# Patient Record
Sex: MALE | Race: WHITE | NOT HISPANIC OR LATINO | URBAN - METROPOLITAN AREA
[De-identification: names, ages, dates, MRNs, and addresses within clinical notes are randomized per-mention and may not be internally consistent; named-entity substitution may affect disease eponyms.]

---

## 2017-01-09 ENCOUNTER — AMBULATORY SURGICAL CENTER (AMBULATORY)
Dept: URBAN - METROPOLITAN AREA SURGERY 17 | Facility: SURGERY | Age: 67
End: 2017-01-09

## 2017-01-09 ENCOUNTER — OFFICE (AMBULATORY)
Dept: URBAN - METROPOLITAN AREA CLINIC 64 | Facility: CLINIC | Age: 67
End: 2017-01-09

## 2017-01-09 VITALS
DIASTOLIC BLOOD PRESSURE: 44 MMHG | SYSTOLIC BLOOD PRESSURE: 138 MMHG | SYSTOLIC BLOOD PRESSURE: 122 MMHG | HEART RATE: 48 BPM | DIASTOLIC BLOOD PRESSURE: 77 MMHG | OXYGEN SATURATION: 93 % | OXYGEN SATURATION: 94 % | OXYGEN SATURATION: 98 % | DIASTOLIC BLOOD PRESSURE: 51 MMHG | HEART RATE: 68 BPM | SYSTOLIC BLOOD PRESSURE: 107 MMHG | DIASTOLIC BLOOD PRESSURE: 48 MMHG | TEMPERATURE: 96.9 F | RESPIRATION RATE: 16 BRPM | DIASTOLIC BLOOD PRESSURE: 62 MMHG | SYSTOLIC BLOOD PRESSURE: 117 MMHG | RESPIRATION RATE: 15 BRPM | DIASTOLIC BLOOD PRESSURE: 41 MMHG | HEART RATE: 42 BPM | HEIGHT: 70 IN | DIASTOLIC BLOOD PRESSURE: 81 MMHG | OXYGEN SATURATION: 95 % | DIASTOLIC BLOOD PRESSURE: 35 MMHG | HEART RATE: 57 BPM | HEART RATE: 52 BPM | OXYGEN SATURATION: 92 % | RESPIRATION RATE: 17 BRPM | SYSTOLIC BLOOD PRESSURE: 195 MMHG | HEART RATE: 47 BPM | SYSTOLIC BLOOD PRESSURE: 150 MMHG | HEART RATE: 62 BPM | RESPIRATION RATE: 20 BRPM | WEIGHT: 225 LBS | SYSTOLIC BLOOD PRESSURE: 118 MMHG | HEART RATE: 43 BPM | RESPIRATION RATE: 9 BRPM | DIASTOLIC BLOOD PRESSURE: 50 MMHG | DIASTOLIC BLOOD PRESSURE: 78 MMHG | RESPIRATION RATE: 7 BRPM | TEMPERATURE: 98 F | SYSTOLIC BLOOD PRESSURE: 153 MMHG

## 2017-01-09 DIAGNOSIS — Z86.010 PERSONAL HISTORY OF COLONIC POLYPS: ICD-10-CM

## 2017-01-09 DIAGNOSIS — Z12.11 ENCOUNTER FOR SCREENING FOR MALIGNANT NEOPLASM OF COLON: ICD-10-CM

## 2017-01-09 DIAGNOSIS — R19.7 DIARRHEA, UNSPECIFIED: ICD-10-CM

## 2017-01-09 LAB
GI HISTOLOGY: A. SELECT: (no result)
GI HISTOLOGY: PDF REPORT: (no result)

## 2017-01-09 PROCEDURE — 88305 TISSUE EXAM BY PATHOLOGIST: CPT

## 2017-01-09 PROCEDURE — 45380 COLONOSCOPY AND BIOPSY: CPT

## 2017-01-09 RX ADMIN — LIDOCAINE HYDROCHLORIDE 25 MG: 10 INJECTION, SOLUTION EPIDURAL; INFILTRATION; INTRACAUDAL; PERINEURAL at 11:18

## 2017-01-09 RX ADMIN — PROPOFOL 25 MG: 10 INJECTION, EMULSION INTRAVENOUS at 11:21

## 2017-01-09 RX ADMIN — PROPOFOL 25 MG: 10 INJECTION, EMULSION INTRAVENOUS at 11:22

## 2017-01-09 RX ADMIN — PROPOFOL 25 MG: 10 INJECTION, EMULSION INTRAVENOUS at 11:28

## 2017-01-09 RX ADMIN — PROPOFOL 25 MG: 10 INJECTION, EMULSION INTRAVENOUS at 11:19

## 2017-01-09 RX ADMIN — PROPOFOL 25 MG: 10 INJECTION, EMULSION INTRAVENOUS at 11:20

## 2017-01-09 RX ADMIN — PROPOFOL 25 MG: 10 INJECTION, EMULSION INTRAVENOUS at 11:30

## 2017-01-09 RX ADMIN — PROPOFOL 25 MG: 10 INJECTION, EMULSION INTRAVENOUS at 11:25

## 2017-01-09 RX ADMIN — PROPOFOL 50 MG: 10 INJECTION, EMULSION INTRAVENOUS at 11:18

## 2017-01-09 RX ADMIN — PROPOFOL 25 MG: 10 INJECTION, EMULSION INTRAVENOUS at 11:26

## 2017-01-09 RX ADMIN — PROPOFOL 25 MG: 10 INJECTION, EMULSION INTRAVENOUS at 11:23

## 2017-01-09 RX ADMIN — PROPOFOL 25 MG: 10 INJECTION, EMULSION INTRAVENOUS at 11:31

## 2019-08-26 ENCOUNTER — OFFICE VISIT (OUTPATIENT)
Dept: CARDIOLOGY | Facility: CLINIC | Age: 69
End: 2019-08-26

## 2019-08-26 VITALS
WEIGHT: 233 LBS | HEART RATE: 46 BPM | HEIGHT: 68 IN | SYSTOLIC BLOOD PRESSURE: 182 MMHG | DIASTOLIC BLOOD PRESSURE: 82 MMHG | BODY MASS INDEX: 35.31 KG/M2

## 2019-08-26 DIAGNOSIS — I25.10 CORONARY ARTERY DISEASE INVOLVING NATIVE CORONARY ARTERY OF NATIVE HEART WITHOUT ANGINA PECTORIS: ICD-10-CM

## 2019-08-26 DIAGNOSIS — E78.2 MIXED HYPERLIPIDEMIA: ICD-10-CM

## 2019-08-26 DIAGNOSIS — R00.1 BRADYCARDIA: Primary | ICD-10-CM

## 2019-08-26 DIAGNOSIS — G47.33 OBSTRUCTIVE SLEEP APNEA SYNDROME: ICD-10-CM

## 2019-08-26 DIAGNOSIS — Z95.5 STATUS POST INSERTION OF DRUG ELUTING CORONARY ARTERY STENT: ICD-10-CM

## 2019-08-26 DIAGNOSIS — I10 ESSENTIAL HYPERTENSION: ICD-10-CM

## 2019-08-26 PROBLEM — Z94.83 HISTORY OF SIMULTANEOUS KIDNEY AND PANCREAS TRANSPLANT (HCC): Status: ACTIVE | Noted: 2019-08-26

## 2019-08-26 PROBLEM — Z94.0 HISTORY OF SIMULTANEOUS KIDNEY AND PANCREAS TRANSPLANT (HCC): Status: ACTIVE | Noted: 2019-08-26

## 2019-08-26 PROCEDURE — 99214 OFFICE O/P EST MOD 30 MIN: CPT | Performed by: INTERNAL MEDICINE

## 2019-08-26 NOTE — PROGRESS NOTES
Subjective:     Encounter Date:08/26/2019      Patient ID: Armando Montano is a 68 y.o. male.    Chief Complaint:  Chief Complaint   Patient presents with   • Coronary Artery Disease       HPI:  I the pleasure seeing Armando in the office today.  He is a pleasant 68-year-old gentleman with a history of hypertension, kidney and pancreas transplant, coronary artery disease with prior stent placements.  He also has a history of dyslipidemia, diabetes mellitus and obstructive sleep apnea.  He has a history of bradycardia.    Armando is in the office today for continued care.  He denies symptoms of chest discomfort or shortness of air.  He did not have symptoms prior to his stent placements.  His blood pressure is significantly elevated.  He states he has not taken his medication today.  His wife will monitor his blood pressure at home and call me with any continued elevated results.  He denies orthopnea or paroxysmal nocturnal dyspnea.  He has mild lower extremity edema.  He denies palpitations, dizziness or near syncope.    The following portions of the patient's history were reviewed and updated as appropriate: allergies, current medications, past family history, past medical history, past social history, past surgical history and problem list.    Problem List:  Patient Active Problem List   Diagnosis   • Stroke (CMS/[facility])   • Sleep apnea   • Hypertension   • Hyperlipidemia   • Diabetes mellitus (CMS/[facility])   • Coronary artery disease   • Bradycardia   • Status post insertion of drug eluting coronary artery stent   • History of simultaneous kidney and pancreas transplant (CMS/[facility])       Past Medical History:  Past Medical History:   Diagnosis Date   • Bradycardia    • Coronary artery disease     May 2011: Vision bare-metal stent placed to the LAD.  December 2012: Xience drug-eluting stent placed to the LAD, overlapping the previous stent.   • Coronary artery disease     10/2009: Left main normal; mid LAD 30%;  diagonal with minial disease. % stenosis, small vessel. RCA minimal disease. 5/2011: LAD with mid 80% stenosis and distal 40%. OM 2 with 30% stenosis. OM3, small with 80-90% stenosis RCA dominant with distal 20%. May 2011: Vision bare-metal stent to the LAD. 12/2012: Xience stent to the LAD overlapping previously placed stent. Stent placed to the OM2   • Diabetes mellitus (CMS/HCC)    • Hyperlipidemia    • Hypertension    • Sleep apnea    • Stroke (CMS/HCC)        Past Surgical History:  Past Surgical History:   Procedure Laterality Date   • APPENDECTOMY     • CARDIAC CATHETERIZATION     • CHOLECYSTECTOMY     • COMBINED KIDNEY-PANCREAS TRANSPLANT     • CORONARY STENT PLACEMENT      May 2011: Vision bare-metal stent to the LAD. December 2012: Xience stent to the LAD overlapping the previously placed stent. Stent placed to the OM2.   • TONSILLECTOMY AND ADENOIDECTOMY         Social History:  Social History     Socioeconomic History   • Marital status:      Spouse name: Not on file   • Number of children: Not on file   • Years of education: Not on file   • Highest education level: Not on file   Tobacco Use   • Smoking status: Never Smoker   Substance and Sexual Activity   • Alcohol use: No     Frequency: Never   • Drug use: No   • Sexual activity: Defer       Allergies:  Allergies   Allergen Reactions   • Bactrim  [Sulfamethoxazole-Trimethoprim] Unknown (See Comments)   • Furosemide Unknown (See Comments)   • Rosuvastatin Calcium Unknown (See Comments)           ROS:  Review of Systems   Constitution: Positive for malaise/fatigue. Negative for chills, decreased appetite, fever, weight gain and weight loss.   HENT: Negative for congestion, hoarse voice, nosebleeds and sore throat.    Eyes: Positive for vision loss in left eye and vision loss in right eye. Negative for blurred vision, double vision and visual disturbance.   Cardiovascular: Positive for leg swelling. Negative for chest pain, claudication,  "dyspnea on exertion, irregular heartbeat, near-syncope, orthopnea, palpitations, paroxysmal nocturnal dyspnea and syncope.   Respiratory: Negative for cough, hemoptysis, shortness of breath, sleep disturbances due to breathing, snoring, sputum production and wheezing.    Endocrine: Negative for cold intolerance, heat intolerance, polydipsia and polyuria.   Hematologic/Lymphatic: Negative for adenopathy and bleeding problem. Bruises/bleeds easily.   Skin: Negative for flushing, itching, nail changes and rash.   Musculoskeletal: Negative for arthritis, back pain, joint pain, muscle cramps, muscle weakness, myalgias and neck pain.   Gastrointestinal: Negative for bloating, abdominal pain, anorexia, change in bowel habit, constipation, diarrhea, heartburn, hematemesis, hematochezia, jaundice, melena, nausea and vomiting.   Genitourinary: Negative for dysuria, hematuria and nocturia.   Neurological: Negative for brief paralysis, disturbances in coordination, excessive daytime sleepiness, dizziness, headaches, light-headedness, loss of balance, numbness, paresthesias, seizures and vertigo.   Psychiatric/Behavioral: Negative for altered mental status and depression. The patient is not nervous/anxious.    Allergic/Immunologic: Negative for environmental allergies and hives.          Objective:         BP (!) 182/82   Pulse (!) 46   Ht 172.7 cm (68\")   Wt 106 kg (233 lb)   BMI 35.43 kg/m²     Physical Exam   Constitutional: He is oriented to person, place, and time. He appears well-developed and well-nourished. No distress.   HENT:   Head: Normocephalic and atraumatic.   Mouth/Throat: Oropharynx is clear and moist.   Eyes: Conjunctivae and EOM are normal. Pupils are equal, round, and reactive to light. No scleral icterus.   Neck: Normal range of motion. Neck supple. No thyromegaly present.   Cardiovascular: Normal rate, regular rhythm, S1 normal, S2 normal and intact distal pulses.  No extrasystoles are present. PMI is " not displaced. Exam reveals no gallop, no S3, no S4, no friction rub and no decreased pulses.   Murmur ( There is a 2/6 systolic murmur heard at the mid left sternal border.) heard.  Pulses:       Carotid pulses are 2+ on the right side, and 2+ on the left side.       Dorsalis pedis pulses are 2+ on the right side, and 2+ on the left side.        Posterior tibial pulses are 2+ on the right side, and 2+ on the left side.   Pulmonary/Chest: Effort normal and breath sounds normal. No respiratory distress. He has no wheezes. He has no rales.   Abdominal: Soft. Bowel sounds are normal. He exhibits no distension and no mass. There is no tenderness. There is no rebound and no guarding.   Musculoskeletal: Normal range of motion. He exhibits edema ( Mild lower extremity edema).   Lymphadenopathy:     He has no cervical adenopathy.   Neurological: He is alert and oriented to person, place, and time. Coordination normal.   Skin: Skin is warm and dry. No rash noted. He is not diaphoretic. No pallor.   Psychiatric: He has a normal mood and affect. His behavior is normal.   Nursing note and vitals reviewed.      In-Office Procedure(s):  Procedures    ASCVD RIsk Score::  The ASCVD Risk score (Mitchel DC Jr., et al., 2013) failed to calculate for the following reasons:    Cannot find a previous HDL lab    Cannot find a previous total cholesterol lab    Recent Radiology:  Imaging Results (most recent)     None          Lab Review:   not applicable           Invalid input(s): ALKPO4                        Invalid input(s): LDLCALC                  Problems Addressed this Visit        Cardiovascular and Mediastinum    Hypertension     His blood pressure significantly elevated in the office today.  He has not taken his medications as yet.  He will go home and take his medications.  His wife will take his blood pressure after he takes his medications.  She was begin to take his blood pressure on a routine basis and call me with continued  elevated results         Hyperlipidemia     On statin therapy.  We will try to obtain his most recent lab results         Coronary artery disease     Mr. Montano has not had an ischemic assessment for his underlying coronary disease quite some time.  He was asymptomatic prior to both of his stents.  Will be scheduled for a nuclear pharmacologic stress test         Relevant Orders    Stress Test With Myocardial Perfusion (1 Day)    Bradycardia - Primary     Mr. Montano has a long-standing history of sinus bradycardia.  He is asymptomatic.            Respiratory    Sleep apnea     Uses CPAP            Other    Status post insertion of drug eluting coronary artery stent     Stable               Rosemarie Shell MD  08/26/19  .

## 2019-08-26 NOTE — ASSESSMENT & PLAN NOTE
His blood pressure significantly elevated in the office today.  He has not taken his medications as yet.  He will go home and take his medications.  His wife will take his blood pressure after he takes his medications.  She was begin to take his blood pressure on a routine basis and call me with continued elevated results

## 2019-08-26 NOTE — ASSESSMENT & PLAN NOTE
Mr. Montano has not had an ischemic assessment for his underlying coronary disease quite some time.  He was asymptomatic prior to both of his stents.  Will be scheduled for a nuclear pharmacologic stress test

## 2019-09-05 ENCOUNTER — APPOINTMENT (OUTPATIENT)
Dept: NUCLEAR MEDICINE | Facility: HOSPITAL | Age: 69
End: 2019-09-05

## 2019-09-11 ENCOUNTER — APPOINTMENT (OUTPATIENT)
Dept: NUCLEAR MEDICINE | Facility: HOSPITAL | Age: 69
End: 2019-09-11

## 2019-09-11 ENCOUNTER — HOSPITAL ENCOUNTER (OUTPATIENT)
Dept: NUCLEAR MEDICINE | Facility: HOSPITAL | Age: 69
Discharge: HOME | End: 2019-09-11

## 2019-09-11 DIAGNOSIS — I25.10 CORONARY ARTERY DISEASE INVOLVING NATIVE CORONARY ARTERY OF NATIVE HEART WITHOUT ANGINA PECTORIS: ICD-10-CM

## 2019-09-11 PROCEDURE — A9500 TC99M SESTAMIBI: HCPCS | Performed by: INTERNAL MEDICINE

## 2019-09-11 PROCEDURE — 25010000002 REGADENOSON 0.4 MG/5ML SOLUTION: Performed by: INTERNAL MEDICINE

## 2019-09-11 PROCEDURE — 93016 CV STRESS TEST SUPVJ ONLY: CPT | Performed by: NURSE PRACTITIONER

## 2019-09-11 PROCEDURE — 78452 HT MUSCLE IMAGE SPECT MULT: CPT

## 2019-09-11 PROCEDURE — 0 TECHNETIUM SESTAMIBI: Performed by: INTERNAL MEDICINE

## 2019-09-11 PROCEDURE — 93017 CV STRESS TEST TRACING ONLY: CPT

## 2019-09-11 PROCEDURE — 93018 CV STRESS TEST I&R ONLY: CPT | Performed by: INTERNAL MEDICINE

## 2019-09-11 PROCEDURE — 78452 HT MUSCLE IMAGE SPECT MULT: CPT | Performed by: INTERNAL MEDICINE

## 2019-09-11 RX ORDER — CLOPIDOGREL BISULFATE 75 MG/1
75 TABLET ORAL DAILY
Qty: 90 TABLET | Refills: 3 | Status: SHIPPED | OUTPATIENT
Start: 2019-09-11 | End: 2020-09-15 | Stop reason: SDUPTHER

## 2019-09-11 RX ADMIN — REGADENOSON 0.4 MG: 0.08 INJECTION, SOLUTION INTRAVENOUS at 13:00

## 2019-09-11 RX ADMIN — TECHNETIUM TC 99M SESTAMIBI 1 DOSE: 1 INJECTION INTRAVENOUS at 11:25

## 2019-09-11 RX ADMIN — TECHNETIUM TC 99M SESTAMIBI 1 DOSE: 1 INJECTION INTRAVENOUS at 13:00

## 2019-09-17 ENCOUNTER — TELEPHONE (OUTPATIENT)
Dept: CARDIOLOGY | Facility: CLINIC | Age: 69
End: 2019-09-17

## 2019-09-17 DIAGNOSIS — R94.39 ABNORMAL NUCLEAR STRESS TEST: Primary | ICD-10-CM

## 2019-09-17 LAB
BH CV NUCLEAR PRIOR STUDY: 3
BH CV STRESS BP STAGE 1: NORMAL
BH CV STRESS BP STAGE 2: NORMAL
BH CV STRESS BP STAGE 3: NORMAL
BH CV STRESS BP STAGE 4: NORMAL
BH CV STRESS COMMENTS STAGE 1: NORMAL
BH CV STRESS COMMENTS STAGE 2: NORMAL
BH CV STRESS DOSE REGADENOSON STAGE 1: 0.4
BH CV STRESS DURATION MIN STAGE 1: 0
BH CV STRESS DURATION MIN STAGE 2: 4
BH CV STRESS DURATION SEC STAGE 1: 10
BH CV STRESS DURATION SEC STAGE 2: 0
BH CV STRESS HR STAGE 1: 64
BH CV STRESS HR STAGE 2: 67
BH CV STRESS HR STAGE 3: 64
BH CV STRESS HR STAGE 4: 61
BH CV STRESS PROTOCOL 1: NORMAL
BH CV STRESS RECOVERY BP: NORMAL MMHG
BH CV STRESS RECOVERY HR: 61 BPM
BH CV STRESS STAGE 1: 1
BH CV STRESS STAGE 2: 2
BH CV STRESS STAGE 3: 3
BH CV STRESS STAGE 4: 4
MAXIMAL PREDICTED HEART RATE: 154 BPM
PERCENT MAX PREDICTED HR: 43.51 %
STRESS BASELINE BP: NORMAL MMHG
STRESS BASELINE HR: 55 BPM
STRESS PERCENT HR: 51 %
STRESS POST PEAK BP: NORMAL MMHG
STRESS POST PEAK HR: 67 BPM
STRESS TARGET HR: 131 BPM

## 2019-09-18 DIAGNOSIS — Z01.818 PRE-OP TESTING: Primary | ICD-10-CM

## 2019-09-18 PROBLEM — R94.39 ABNORMAL NUCLEAR STRESS TEST: Status: ACTIVE | Noted: 2019-09-18

## 2019-09-20 ENCOUNTER — LAB (OUTPATIENT)
Dept: LAB | Facility: HOSPITAL | Age: 69
End: 2019-09-20

## 2019-09-20 ENCOUNTER — HOSPITAL ENCOUNTER (OUTPATIENT)
Dept: CARDIOLOGY | Facility: HOSPITAL | Age: 69
Discharge: HOME | End: 2019-09-20
Admitting: INTERNAL MEDICINE

## 2019-09-20 DIAGNOSIS — Z01.810 PREOP CARDIOVASCULAR EXAM: Primary | ICD-10-CM

## 2019-09-20 DIAGNOSIS — I10 ESSENTIAL HYPERTENSION: ICD-10-CM

## 2019-09-20 DIAGNOSIS — I25.10 CORONARY ARTERY DISEASE INVOLVING NATIVE CORONARY ARTERY OF NATIVE HEART WITHOUT ANGINA PECTORIS: ICD-10-CM

## 2019-09-20 DIAGNOSIS — Z01.818 PRE-OP TESTING: ICD-10-CM

## 2019-09-20 LAB
ANION GAP SERPL CALCULATED.3IONS-SCNC: 10.8 MMOL/L (ref 5–15)
APTT PPP: 22.9 SECONDS (ref 24–31)
BASOPHILS # BLD AUTO: 0 10*3/MM3 (ref 0–0.2)
BASOPHILS NFR BLD AUTO: 0.5 % (ref 0–1.5)
BUN BLD-MCNC: 19 MG/DL (ref 8–20)
BUN/CREAT SERPL: 19 (ref 6.2–20.3)
CALCIUM SPEC-SCNC: 8.5 MG/DL (ref 8.9–10.3)
CHLORIDE SERPL-SCNC: 110 MMOL/L (ref 101–111)
CO2 SERPL-SCNC: 24 MMOL/L (ref 22–32)
CREAT BLD-MCNC: 1 MG/DL (ref 0.7–1.2)
DEPRECATED RDW RBC AUTO: 52.1 FL (ref 37–54)
EOSINOPHIL # BLD AUTO: 0.1 10*3/MM3 (ref 0–0.4)
EOSINOPHIL NFR BLD AUTO: 1.1 % (ref 0.3–6.2)
ERYTHROCYTE [DISTWIDTH] IN BLOOD BY AUTOMATED COUNT: 14.8 % (ref 12.3–15.4)
GFR SERPL CREATININE-BSD FRML MDRD: 75 ML/MIN/1.73
GLUCOSE BLD-MCNC: 88 MG/DL (ref 65–99)
HCT VFR BLD AUTO: 41 % (ref 37.5–51)
HGB BLD-MCNC: 13.4 G/DL (ref 13–17.7)
INR PPP: 1.2 (ref 0.9–1.1)
LYMPHOCYTES # BLD AUTO: 0.6 10*3/MM3 (ref 0.7–3.1)
LYMPHOCYTES NFR BLD AUTO: 10.4 % (ref 19.6–45.3)
MCH RBC QN AUTO: 33.1 PG (ref 26.6–33)
MCHC RBC AUTO-ENTMCNC: 32.8 G/DL (ref 31.5–35.7)
MCV RBC AUTO: 100.9 FL (ref 79–97)
MONOCYTES # BLD AUTO: 0.6 10*3/MM3 (ref 0.1–0.9)
MONOCYTES NFR BLD AUTO: 9.5 % (ref 5–12)
NEUTROPHILS # BLD AUTO: 4.6 10*3/MM3 (ref 1.7–7)
NEUTROPHILS NFR BLD AUTO: 78.5 % (ref 42.7–76)
NRBC BLD AUTO-RTO: 0.1 /100 WBC (ref 0–0.2)
PLATELET # BLD AUTO: 165 10*3/MM3 (ref 140–450)
PMV BLD AUTO: 9.8 FL (ref 6–12)
POTASSIUM BLD-SCNC: 3.8 MMOL/L (ref 3.6–5.1)
PROTHROMBIN TIME: 12 SECONDS (ref 9.6–11.7)
RBC # BLD AUTO: 4.06 10*6/MM3 (ref 4.14–5.8)
SODIUM BLD-SCNC: 141 MMOL/L (ref 136–144)
WBC NRBC COR # BLD: 5.9 10*3/MM3 (ref 3.4–10.8)

## 2019-09-20 PROCEDURE — 36415 COLL VENOUS BLD VENIPUNCTURE: CPT

## 2019-09-20 PROCEDURE — 93005 ELECTROCARDIOGRAM TRACING: CPT | Performed by: INTERNAL MEDICINE

## 2019-09-20 PROCEDURE — 80048 BASIC METABOLIC PNL TOTAL CA: CPT

## 2019-09-20 PROCEDURE — 85610 PROTHROMBIN TIME: CPT

## 2019-09-20 PROCEDURE — 85730 THROMBOPLASTIN TIME PARTIAL: CPT

## 2019-09-20 PROCEDURE — 85025 COMPLETE CBC W/AUTO DIFF WBC: CPT

## 2019-09-20 RX ORDER — SODIUM PHOSPHATE,MONO-DIBASIC 19G-7G/118
1 ENEMA (ML) RECTAL DAILY
COMMUNITY

## 2019-09-23 ENCOUNTER — RESULTS ENCOUNTER (OUTPATIENT)
Dept: CARDIOLOGY | Facility: CLINIC | Age: 69
End: 2019-09-23

## 2019-09-23 ENCOUNTER — HOSPITAL ENCOUNTER (OUTPATIENT)
Facility: HOSPITAL | Age: 69
Setting detail: HOSPITAL OUTPATIENT SURGERY
Discharge: HOME | End: 2019-09-23
Attending: INTERNAL MEDICINE | Admitting: INTERNAL MEDICINE

## 2019-09-23 VITALS
BODY MASS INDEX: 33.77 KG/M2 | DIASTOLIC BLOOD PRESSURE: 52 MMHG | HEIGHT: 70 IN | WEIGHT: 235.89 LBS | HEART RATE: 49 BPM | SYSTOLIC BLOOD PRESSURE: 117 MMHG | RESPIRATION RATE: 24 BRPM | TEMPERATURE: 97.5 F | OXYGEN SATURATION: 92 %

## 2019-09-23 DIAGNOSIS — Z01.818 PRE-OP TESTING: ICD-10-CM

## 2019-09-23 DIAGNOSIS — R94.39 ABNORMAL NUCLEAR STRESS TEST: ICD-10-CM

## 2019-09-23 PROCEDURE — C1769 GUIDE WIRE: HCPCS | Performed by: INTERNAL MEDICINE

## 2019-09-23 PROCEDURE — 25010000002 FENTANYL CITRATE (PF) 100 MCG/2ML SOLUTION: Performed by: INTERNAL MEDICINE

## 2019-09-23 PROCEDURE — 93458 L HRT ARTERY/VENTRICLE ANGIO: CPT | Performed by: INTERNAL MEDICINE

## 2019-09-23 PROCEDURE — 25010000002 MIDAZOLAM PER 1 MG: Performed by: INTERNAL MEDICINE

## 2019-09-23 PROCEDURE — 99152 MOD SED SAME PHYS/QHP 5/>YRS: CPT | Performed by: INTERNAL MEDICINE

## 2019-09-23 PROCEDURE — 0 IOPAMIDOL PER 1 ML: Performed by: INTERNAL MEDICINE

## 2019-09-23 PROCEDURE — C1760 CLOSURE DEV, VASC: HCPCS | Performed by: INTERNAL MEDICINE

## 2019-09-23 PROCEDURE — C1894 INTRO/SHEATH, NON-LASER: HCPCS | Performed by: INTERNAL MEDICINE

## 2019-09-23 RX ORDER — LIDOCAINE HYDROCHLORIDE 20 MG/ML
INJECTION, SOLUTION INFILTRATION; PERINEURAL AS NEEDED
Status: DISCONTINUED | OUTPATIENT
Start: 2019-09-23 | End: 2019-09-23 | Stop reason: HOSPADM

## 2019-09-23 RX ORDER — SODIUM CHLORIDE 9 MG/ML
100 INJECTION, SOLUTION INTRAVENOUS CONTINUOUS
Status: DISCONTINUED | OUTPATIENT
Start: 2019-09-23 | End: 2019-09-23 | Stop reason: HOSPADM

## 2019-09-23 RX ORDER — MIDAZOLAM HYDROCHLORIDE 1 MG/ML
INJECTION INTRAMUSCULAR; INTRAVENOUS AS NEEDED
Status: DISCONTINUED | OUTPATIENT
Start: 2019-09-23 | End: 2019-09-23 | Stop reason: HOSPADM

## 2019-09-23 RX ORDER — SODIUM CHLORIDE 9 MG/ML
30 INJECTION, SOLUTION INTRAVENOUS CONTINUOUS
Status: DISCONTINUED | OUTPATIENT
Start: 2019-09-23 | End: 2019-09-23

## 2019-09-23 RX ORDER — FENTANYL CITRATE 50 UG/ML
INJECTION, SOLUTION INTRAMUSCULAR; INTRAVENOUS AS NEEDED
Status: DISCONTINUED | OUTPATIENT
Start: 2019-09-23 | End: 2019-09-23 | Stop reason: HOSPADM

## 2019-09-23 RX ADMIN — SODIUM CHLORIDE 30 ML/HR: 900 INJECTION, SOLUTION INTRAVENOUS at 09:25

## 2019-09-23 NOTE — INTERVAL H&P NOTE
H&P updated. The patient was examined and the following changes are noted:  Nuclear stress testing performed and showed medium-sized, moderately severe area of ischemia located in the anterior wall and basal inferior lateral wall.

## 2019-09-23 NOTE — DISCHARGE INSTRUCTIONS
Post Cath Instructions      Call Dr. Pereira's office to schedule a follow up appointment in 4 weeks.    1) Drink plenty of fluids for the next 24 hours.  This helps to eliminate the dye used in your procedure through urination.  You may resume a normal diet; however, try to avoid foods that would cause gas or constipation.    2) Sedative medication given to you during your catheterization may decrease your judgement and reaction time for up to 24-48 hours.  Therefore:  a. DO NOT drive or operate hazardous machinery (48 hours)  b. DO NOT consume alcoholic beverages  c. DO NOT make any important/legal decisions  d. Have someone stay with you for at least 24 hours    3) To allow proper healing and prevent bleeding, the following activities are to be strictly avoided for the next 24-48 hours:  a. Excessive bending at wound site  b. Straining (anything that would tense up muscles around the affected puncture site)  c. Lifting objects greater than 5 pounds, pushing, or pulling for 5 days  i. For Groin Cases:  1. Refrain from sexual activity  2. Refrain from running or vigorous walking  3. No prolonged sitting or standing  4. Limit stair climbing as much as possible    4) Keep the puncture site clean and dry.  You may remove the dressing tomorrow and replace it with a band-aid for at least one additional day.  Gently clean the site with mild soap and water.  No scrubbing/rubbing and lightly pat the area dry.  Showers are acceptable; however, avoid submerging in water (tub baths, hot tubs, swimming pools, dishwater, etc…) for at least one week.  The site should be completely healed before resuming these activities to reduce the risk of infection.  Check the site often.  Watch for signs and symptoms of infection and notify your physician if any of the following occur:  a. Bleeding or an increase in swelling at the puncture site  b. Fever  c. Increased soreness around puncture site  d. Foul odor or significant drainage from  the puncture site  e. Swelling, redness, or warmth at the puncture site    **A bruise or small “pea sized” lump under the skin at the puncture site is not unusual.  This should disappear within 3-4 weeks.**  5) CONTACT YOUR PHYSICIAN OR CALL 911 IF YOU EXPERIENCE ANY OF THE FOLLOWING:  a. Increased angina (chest pain) or frequent sensations of pressure, burning, pain, or other discomfort in the chest, arm, jaws, or stomach  b. Lightheadedness, dizziness, faint feeling, sweating, or difficulty breathing  c. Odd sensation changes like numbness, tingling, coldness, or pain in the arm or leg in which the catheter was inserted  d. Limb in which the catheter was inserted becomes pale/bluish in color    IMPORTANT:  Although this occurs very rarely, if you should develop bright red or excessive bleeding, feel a “pop” inside at the insertion site, or notice a sudden increase in swelling larger than a walnut, you should call 911.  Hold continuous firm pressure to the access site until emergency personnel arrive.  It is best if someone else can do this for you.

## 2019-09-30 PROCEDURE — 93010 ELECTROCARDIOGRAM REPORT: CPT | Performed by: INTERNAL MEDICINE

## 2019-10-17 ENCOUNTER — OFFICE VISIT (OUTPATIENT)
Dept: CARDIOLOGY | Facility: CLINIC | Age: 69
End: 2019-10-17

## 2019-10-17 VITALS
HEART RATE: 51 BPM | HEIGHT: 69 IN | WEIGHT: 239 LBS | BODY MASS INDEX: 35.4 KG/M2 | DIASTOLIC BLOOD PRESSURE: 56 MMHG | SYSTOLIC BLOOD PRESSURE: 132 MMHG

## 2019-10-17 DIAGNOSIS — E78.2 MIXED HYPERLIPIDEMIA: ICD-10-CM

## 2019-10-17 DIAGNOSIS — I49.5 SINUS NODE DYSFUNCTION (HCC): Primary | ICD-10-CM

## 2019-10-17 DIAGNOSIS — I25.10 CORONARY ARTERY DISEASE INVOLVING NATIVE CORONARY ARTERY OF NATIVE HEART WITHOUT ANGINA PECTORIS: ICD-10-CM

## 2019-10-17 DIAGNOSIS — I10 ESSENTIAL HYPERTENSION: ICD-10-CM

## 2019-10-17 PROCEDURE — 99213 OFFICE O/P EST LOW 20 MIN: CPT | Performed by: INTERNAL MEDICINE

## 2019-10-17 NOTE — PROGRESS NOTES
Subjective:     Encounter Date:10/17/2019      Patient ID: Armando Montano is a 69 y.o. male.    Chief Complaint:  Chief Complaint   Patient presents with   • Coronary Artery Disease       HPI:  History of Present Illness  I had the pleasure of seeing Armando in the office today.  He is a very pleasant 69-year-old gentleman coronary artery disease, bradycardia, fatigue and renal and pancreas transplantation.  He did not have symptoms prior to his previous stent placement but had recently noted increased fatigue.  A nuclear study was performed which was abnormal.  He then underwent cardiac catheterization which demonstrated the left main to be normal.  The LAD had patent stents with 5 to 10% in-stent restenosis.  Circumflex gave rise to the marginal branches.  There is a focal 60% stenosis in the obtuse marginal branch.  RCA had a 40% lesion.    Armando is following up in the office today.  He does state that he is fatigued and his activity level is decreased.  He is not complaining of chest discomfort or any increased shortness of air.  His blood pressure is fairly well controlled in the office but I have asked him to start measuring this at home as well.  Armando is bradycardic in the office.  He has had bradycardia for a number of years but has tolerated it fairly well.  His rates however seem to be decreasing as he is frequently noted to have heart rates in the 40s.  I did have him ambulate in the mauricio with me to try to assess his chronotropic response.  His baseline heart rate was 51 bpm.  He did walk down the mauricio and back to his room and his heart rate remained in the low 50s.  I suspect that some of his fatigue (certainly not all) may be related to his sinus node dysfunction.    The following portions of the patient's history were reviewed and updated as appropriate: allergies, current medications, past family history, past medical history, past social history, past surgical history and problem  list.    Problem List:  Patient Active Problem List   Diagnosis   • Stroke (CMS/[facility]CC)   • Sleep apnea   • Hypertension   • Hyperlipidemia   • Diabetes mellitus (CMS/[facility]CC)   • Coronary artery disease   • Bradycardia   • Status post insertion of drug eluting coronary artery stent   • History of simultaneous kidney and pancreas transplant (CMS/HCC)   • Abnormal nuclear stress test   • Sinus node dysfunction (CMS/HCC)       Past Medical History:  Past Medical History:   Diagnosis Date   • Bradycardia    • Coronary artery disease     May 2011: Vision bare-metal stent placed to the LAD.  December 2012: Xience drug-eluting stent placed to the LAD, overlapping the previous stent.   • Coronary artery disease     10/2009: Left main normal; mid LAD 30%; diagonal with minial disease. % stenosis, small vessel. RCA minimal disease. 5/2011: LAD with mid 80% stenosis and distal 40%. OM 2 with 30% stenosis. OM3, small with 80-90% stenosis RCA dominant with distal 20%. May 2011: Vision bare-metal stent to the LAD. 12/2012: Xience stent to the LAD overlapping previously placed stent. Stent placed to the OM2   • Diabetes mellitus (CMS/HCC)    • Hyperlipidemia    • Hypertension    • Sleep apnea     wears BiPap at night   • Stroke (CMS/HCC)        Past Surgical History:  Past Surgical History:   Procedure Laterality Date   • APPENDECTOMY     • CARDIAC CATHETERIZATION     • CARDIAC CATHETERIZATION N/A 9/23/2019    Procedure: Left Heart Cath;  Surgeon: Yanick Pereira MD;  Location[facility] CATH INVASIVE LOCATION;  Service: Cardiology   • CHOLECYSTECTOMY     • COMBINED KIDNEY-PANCREAS TRANSPLANT     • CORONARY STENT PLACEMENT      May 2011: Vision bare-metal stent to the LAD. December 2012: Xience stent to the LAD overlapping the previously placed stent. Stent placed to the OM2.   • TONSILLECTOMY AND ADENOIDECTOMY         Social History:  Social History     Socioeconomic History   • Marital status:      Spouse name:  Not on file   • Number of children: Not on file   • Years of education: Not on file   • Highest education level: Not on file   Tobacco Use   • Smoking status: Never Smoker   Substance and Sexual Activity   • Alcohol use: No     Frequency: Never   • Drug use: No   • Sexual activity: Defer       Allergies:  Allergies   Allergen Reactions   • Bactrim  [Sulfamethoxazole-Trimethoprim] Unknown (See Comments)   • Furosemide Unknown (See Comments)   • Rosuvastatin Calcium Unknown (See Comments)       Immunizations:    There is no immunization history on file for this patient.    ROS:  Review of Systems   Constitution: Positive for malaise/fatigue. Negative for chills, decreased appetite, fever, weight gain and weight loss.   HENT: Negative for congestion, hoarse voice, nosebleeds and sore throat.    Eyes: Positive for vision loss in left eye and vision loss in right eye. Negative for blurred vision, double vision and visual disturbance.   Cardiovascular: Positive for leg swelling. Negative for chest pain, claudication, dyspnea on exertion, irregular heartbeat, near-syncope, orthopnea, palpitations, paroxysmal nocturnal dyspnea and syncope.   Respiratory: Negative for cough, hemoptysis, shortness of breath, sleep disturbances due to breathing, snoring, sputum production and wheezing.    Endocrine: Negative for cold intolerance, heat intolerance, polydipsia and polyuria.   Hematologic/Lymphatic: Negative for adenopathy and bleeding problem. Bruises/bleeds easily.   Skin: Negative for flushing, itching, nail changes and rash.   Musculoskeletal: Negative for arthritis, back pain, joint pain, muscle cramps, muscle weakness, myalgias and neck pain.   Gastrointestinal: Negative for bloating, abdominal pain, anorexia, change in bowel habit, constipation, diarrhea, heartburn, hematemesis, hematochezia, jaundice, melena, nausea and vomiting.   Genitourinary: Negative for dysuria, hematuria and nocturia.   Neurological: Negative for  "brief paralysis, disturbances in coordination, excessive daytime sleepiness, dizziness, headaches, light-headedness, loss of balance, numbness, paresthesias, seizures and vertigo.   Psychiatric/Behavioral: Negative for altered mental status and depression. The patient is not nervous/anxious.    Allergic/Immunologic: Negative for environmental allergies and hives.          Objective:         /56   Pulse 51   Ht 175.3 cm (69\")   Wt 108 kg (239 lb)   BMI 35.29 kg/m²     Physical Exam   Constitutional: He is oriented to person, place, and time. He appears well-developed and well-nourished. No distress.   HENT:   Head: Normocephalic and atraumatic.   Mouth/Throat: Oropharynx is clear and moist.   Eyes: Conjunctivae and EOM are normal. Pupils are equal, round, and reactive to light. No scleral icterus.   Neck: Normal range of motion. Neck supple. No thyromegaly present.   Cardiovascular: Normal rate, regular rhythm, S1 normal, S2 normal and intact distal pulses.  No extrasystoles are present. PMI is not displaced. Exam reveals no gallop, no S3, no S4, no friction rub and no decreased pulses.   Murmur ( There is a 2/6 systolic murmur heard at the mid left sternal border.) heard.  Pulses:       Carotid pulses are 2+ on the right side, and 2+ on the left side.       Dorsalis pedis pulses are 2+ on the right side, and 2+ on the left side.        Posterior tibial pulses are 2+ on the right side, and 2+ on the left side.   Pulmonary/Chest: Effort normal and breath sounds normal. No respiratory distress. He has no wheezes. He has no rales.   Abdominal: Soft. Bowel sounds are normal. He exhibits no distension and no mass. There is no tenderness. There is no rebound and no guarding.   Musculoskeletal: Normal range of motion. He exhibits edema ( Mild lower extremity edema).   Lymphadenopathy:     He has no cervical adenopathy.   Neurological: He is alert and oriented to person, place, and time. Coordination normal. "   Skin: Skin is warm and dry. No rash noted. He is not diaphoretic. No pallor.   Psychiatric: He has a normal mood and affect. His behavior is normal.   Nursing note and vitals reviewed.      In-Office Procedure(s):  Procedures    ASCVD RIsk Score::  The ASCVD Risk score ([place] HEMPH[place] Jr., et al., 2013) failed to calculate for the following reasons:    Cannot find a previous HDL lab    Cannot find a previous total cholesterol lab    Recent Radiology:  Imaging Results (most recent)     None          Lab Review:   not applicable             Assessment:          Diagnosis Plan   1. Sinus node dysfunction (CMS/HCC)     2. Essential hypertension     3. Mixed hyperlipidemia     4. Coronary artery disease involving native coronary artery of native heart without angina pectoris            Plan:   1.  Sinus node dysfunction  I suspect that Mr. Montano bradycardia is contributing to some of his fatigue.  He is not on any medications that would lower his heart rate. Upon ambulation in the office, he did not have an appropriate chronotropic response.  I feel he would benefit from a pacemaker and I have discussed this with Dr. Otto Carlson.  The patient and his wife are willing to proceed and would like to have the procedure scheduled at [facility]    2.  Essential hypertension  His blood pressures fairly well controlled in the office today.  I have asked him to record some readings at home    3.  Dyslipidemia  He is on atorvastatin 40 mg daily    4.  CAD  Recent cardiac catheterization with nonobstructive disease and patent stent        Level of Care:                 Rosemarie Shell MD  10/17/19  .

## 2019-10-18 DIAGNOSIS — I49.5 SINUS NODE DYSFUNCTION (HCC): Primary | ICD-10-CM

## 2019-10-18 DIAGNOSIS — R00.1 BRADYCARDIA: ICD-10-CM

## 2019-10-18 DIAGNOSIS — Z01.818 ENCOUNTER FOR PREADMISSION TESTING: ICD-10-CM

## 2019-11-06 ENCOUNTER — LAB (OUTPATIENT)
Dept: LAB | Facility: HOSPITAL | Age: 69
End: 2019-11-06

## 2019-11-06 DIAGNOSIS — I49.5 SINUS NODE DYSFUNCTION (HCC): ICD-10-CM

## 2019-11-06 DIAGNOSIS — Z01.818 ENCOUNTER FOR PREADMISSION TESTING: ICD-10-CM

## 2019-11-06 LAB
ANION GAP SERPL CALCULATED.3IONS-SCNC: 8.7 MMOL/L (ref 5–15)
BASOPHILS # BLD AUTO: 0.03 10*3/MM3 (ref 0–0.2)
BASOPHILS NFR BLD AUTO: 0.4 % (ref 0–1.5)
BUN BLD-MCNC: 24 MG/DL (ref 8–23)
BUN/CREAT SERPL: 25.3 (ref 7–25)
CALCIUM SPEC-SCNC: 8.9 MG/DL (ref 8.6–10.5)
CHLORIDE SERPL-SCNC: 111 MMOL/L (ref 98–107)
CO2 SERPL-SCNC: 25.3 MMOL/L (ref 22–29)
CREAT BLD-MCNC: 0.95 MG/DL (ref 0.76–1.27)
DEPRECATED RDW RBC AUTO: 47.6 FL (ref 37–54)
EOSINOPHIL # BLD AUTO: 0.05 10*3/MM3 (ref 0–0.4)
EOSINOPHIL NFR BLD AUTO: 0.7 % (ref 0.3–6.2)
ERYTHROCYTE [DISTWIDTH] IN BLOOD BY AUTOMATED COUNT: 13.3 % (ref 12.3–15.4)
GFR SERPL CREATININE-BSD FRML MDRD: 79 ML/MIN/1.73
GLUCOSE BLD-MCNC: 91 MG/DL (ref 65–99)
HCT VFR BLD AUTO: 37.5 % (ref 37.5–51)
HGB BLD-MCNC: 13.1 G/DL (ref 13–17.7)
IMM GRANULOCYTES # BLD AUTO: 0.02 10*3/MM3 (ref 0–0.05)
IMM GRANULOCYTES NFR BLD AUTO: 0.3 % (ref 0–0.5)
LYMPHOCYTES # BLD AUTO: 0.51 10*3/MM3 (ref 0.7–3.1)
LYMPHOCYTES NFR BLD AUTO: 7.5 % (ref 19.6–45.3)
MCH RBC QN AUTO: 33.9 PG (ref 26.6–33)
MCHC RBC AUTO-ENTMCNC: 34.9 G/DL (ref 31.5–35.7)
MCV RBC AUTO: 97.2 FL (ref 79–97)
MONOCYTES # BLD AUTO: 0.62 10*3/MM3 (ref 0.1–0.9)
MONOCYTES NFR BLD AUTO: 9.1 % (ref 5–12)
NEUTROPHILS # BLD AUTO: 5.58 10*3/MM3 (ref 1.7–7)
NEUTROPHILS NFR BLD AUTO: 82 % (ref 42.7–76)
NRBC BLD AUTO-RTO: 0 /100 WBC (ref 0–0.2)
PLATELET # BLD AUTO: 176 10*3/MM3 (ref 140–450)
PMV BLD AUTO: 11.8 FL (ref 6–12)
POTASSIUM BLD-SCNC: 4.4 MMOL/L (ref 3.5–5.2)
RBC # BLD AUTO: 3.86 10*6/MM3 (ref 4.14–5.8)
SODIUM BLD-SCNC: 145 MMOL/L (ref 136–145)
WBC NRBC COR # BLD: 6.81 10*3/MM3 (ref 3.4–10.8)

## 2019-11-06 PROCEDURE — 85025 COMPLETE CBC W/AUTO DIFF WBC: CPT

## 2019-11-06 PROCEDURE — 80048 BASIC METABOLIC PNL TOTAL CA: CPT

## 2019-11-06 PROCEDURE — 36415 COLL VENOUS BLD VENIPUNCTURE: CPT

## 2019-11-08 ENCOUNTER — HOSPITAL ENCOUNTER (OUTPATIENT)
Facility: HOSPITAL | Age: 69
Setting detail: HOSPITAL OUTPATIENT SURGERY
Discharge: HOME | End: 2019-11-08
Attending: INTERNAL MEDICINE | Admitting: INTERNAL MEDICINE

## 2019-11-08 ENCOUNTER — APPOINTMENT (OUTPATIENT)
Dept: GENERAL RADIOLOGY | Facility: HOSPITAL | Age: 69
End: 2019-11-08

## 2019-11-08 VITALS
BODY MASS INDEX: 33.64 KG/M2 | HEART RATE: 60 BPM | RESPIRATION RATE: 16 BRPM | OXYGEN SATURATION: 93 % | SYSTOLIC BLOOD PRESSURE: 157 MMHG | DIASTOLIC BLOOD PRESSURE: 74 MMHG | TEMPERATURE: 98.4 F | HEIGHT: 70 IN | WEIGHT: 235 LBS

## 2019-11-08 DIAGNOSIS — I49.5 SINUS NODE DYSFUNCTION (HCC): ICD-10-CM

## 2019-11-08 PROCEDURE — C1898 LEAD, PMKR, OTHER THAN TRANS: HCPCS | Performed by: INTERNAL MEDICINE

## 2019-11-08 PROCEDURE — 25010000002 FENTANYL CITRATE (PF) 100 MCG/2ML SOLUTION: Performed by: INTERNAL MEDICINE

## 2019-11-08 PROCEDURE — 25010000002 MIDAZOLAM PER 1 MG: Performed by: INTERNAL MEDICINE

## 2019-11-08 PROCEDURE — 33208 INSRT HEART PM ATRIAL & VENT: CPT | Performed by: INTERNAL MEDICINE

## 2019-11-08 PROCEDURE — 25010000003 LIDOCAINE 1 % SOLUTION: Performed by: INTERNAL MEDICINE

## 2019-11-08 PROCEDURE — C1785 PMKR, DUAL, RATE-RESP: HCPCS | Performed by: INTERNAL MEDICINE

## 2019-11-08 PROCEDURE — 71045 X-RAY EXAM CHEST 1 VIEW: CPT

## 2019-11-08 PROCEDURE — 99152 MOD SED SAME PHYS/QHP 5/>YRS: CPT | Performed by: INTERNAL MEDICINE

## 2019-11-08 PROCEDURE — C1894 INTRO/SHEATH, NON-LASER: HCPCS | Performed by: INTERNAL MEDICINE

## 2019-11-08 PROCEDURE — 25010000003 CEFAZOLIN IN DEXTROSE 2-4 GM/100ML-% SOLUTION: Performed by: INTERNAL MEDICINE

## 2019-11-08 DEVICE — LD PM TENDRIL STS 6F58CM 2088TC58: Type: IMPLANTABLE DEVICE | Status: FUNCTIONAL

## 2019-11-08 DEVICE — GEN PM ASSURITY MRI DR RF PM2272: Type: IMPLANTABLE DEVICE | Status: FUNCTIONAL

## 2019-11-08 DEVICE — LD PM TENDRIL STS 6F52CM 2088TC52: Type: IMPLANTABLE DEVICE | Status: FUNCTIONAL

## 2019-11-08 RX ORDER — LIDOCAINE HYDROCHLORIDE 10 MG/ML
INJECTION, SOLUTION INFILTRATION; PERINEURAL AS NEEDED
Status: DISCONTINUED | OUTPATIENT
Start: 2019-11-08 | End: 2019-11-08 | Stop reason: HOSPADM

## 2019-11-08 RX ORDER — DOCUSATE SODIUM 100 MG/1
100 CAPSULE, LIQUID FILLED ORAL AS NEEDED
COMMUNITY

## 2019-11-08 RX ORDER — LIDOCAINE HYDROCHLORIDE 10 MG/ML
0.1 INJECTION, SOLUTION EPIDURAL; INFILTRATION; INTRACAUDAL; PERINEURAL ONCE AS NEEDED
Status: DISCONTINUED | OUTPATIENT
Start: 2019-11-08 | End: 2019-11-08 | Stop reason: HOSPADM

## 2019-11-08 RX ORDER — MIDAZOLAM HYDROCHLORIDE 1 MG/ML
INJECTION INTRAMUSCULAR; INTRAVENOUS AS NEEDED
Status: DISCONTINUED | OUTPATIENT
Start: 2019-11-08 | End: 2019-11-08 | Stop reason: HOSPADM

## 2019-11-08 RX ORDER — CEFAZOLIN SODIUM 2 G/100ML
2 INJECTION, SOLUTION INTRAVENOUS ONCE
Status: DISCONTINUED | OUTPATIENT
Start: 2019-11-08 | End: 2019-11-08 | Stop reason: HOSPADM

## 2019-11-08 RX ORDER — CEPHALEXIN 500 MG/1
500 CAPSULE ORAL 4 TIMES DAILY
Qty: 12 CAPSULE | Refills: 3 | Status: SHIPPED | OUTPATIENT
Start: 2019-11-08 | End: 2021-06-02

## 2019-11-08 RX ORDER — SODIUM CHLORIDE 0.9 % (FLUSH) 0.9 %
10 SYRINGE (ML) INJECTION AS NEEDED
Status: DISCONTINUED | OUTPATIENT
Start: 2019-11-08 | End: 2019-11-08 | Stop reason: HOSPADM

## 2019-11-08 RX ORDER — FENTANYL CITRATE 50 UG/ML
INJECTION, SOLUTION INTRAMUSCULAR; INTRAVENOUS AS NEEDED
Status: DISCONTINUED | OUTPATIENT
Start: 2019-11-08 | End: 2019-11-08 | Stop reason: HOSPADM

## 2019-11-08 RX ORDER — CEFAZOLIN SODIUM 2 G/100ML
INJECTION, SOLUTION INTRAVENOUS CONTINUOUS PRN
Status: COMPLETED | OUTPATIENT
Start: 2019-11-08 | End: 2019-11-08

## 2019-11-08 RX ORDER — SODIUM CHLORIDE 0.9 % (FLUSH) 0.9 %
3 SYRINGE (ML) INJECTION EVERY 12 HOURS SCHEDULED
Status: DISCONTINUED | OUTPATIENT
Start: 2019-11-08 | End: 2019-11-08 | Stop reason: HOSPADM

## 2019-11-08 RX ORDER — SODIUM CHLORIDE 9 MG/ML
75 INJECTION, SOLUTION INTRAVENOUS CONTINUOUS
Status: DISCONTINUED | OUTPATIENT
Start: 2019-11-08 | End: 2019-11-08 | Stop reason: HOSPADM

## 2019-11-08 RX ADMIN — SODIUM CHLORIDE 75 ML/HR: 9 INJECTION, SOLUTION INTRAVENOUS at 13:45

## 2019-11-08 NOTE — DISCHARGE SUMMARY
[facility]    DISCHARGE SUMMARY      Patient Name: Armando Montano  :1950  69 y.o.    Date of Admit: 2019  Date of Discharge:  2019    Discharge Diagnosis:  Problem List Items Addressed This Visit        Cardiovascular and Mediastinum    * (Principal) Sinus node dysfunction (CMS/HCC)    Relevant Orders    EP/CRM Study          Hospital Course: Patient underwent implantation of a pacemaker without complications.      Procedures Performed  Procedure(s):  Pacemaker DC new- [facility]       Consults     No orders found from 10/10/2019 to 2019.          Pertinent Test Results:   Results from last 7 days   Lab Units 19  1347   SODIUM mmol/L 145   POTASSIUM mmol/L 4.4   CHLORIDE mmol/L 111*   CO2 mmol/L 25.3   BUN mg/dL 24*   CREATININE mg/dL 0.95   CALCIUM mg/dL 8.9   GLUCOSE mg/dL 91         @LABRCNT(bnp)@  Results from last 7 days   Lab Units 19  1347   WBC 10*3/mm3 6.81   HEMOGLOBIN g/dL 13.1   HEMATOCRIT % 37.5   PLATELETS 10*3/mm3 176                   Condition on Discharge: stable    Discharge Medications     Discharge Medications      New Medications      Instructions Start Date   cephalexin 500 MG capsule  Commonly known as:  KEFLEX   500 mg, Oral, 4 Times Daily         Continue These Medications      Instructions Start Date   amLODIPine 10 MG tablet  Commonly known as:  NORVASC   10 mg, Oral, Daily      aspirin 81 MG EC tablet   81 mg, Oral, Daily      atorvastatin 40 MG tablet  Commonly known as:  LIPITOR   40 mg, Oral, Daily      brimonidine-timolol 0.2-0.5 % ophthalmic solution  Commonly known as:  COMBIGAN   1 drop, Both Eyes, Every 12 Hours      clopidogrel 75 MG tablet  Commonly known as:  PLAVIX   75 mg, Oral, Daily      docusate sodium 100 MG capsule  Commonly known as:  COLACE   100 mg, Oral, As Needed      famotidine 20 MG tablet  Commonly known as:  PEPCID   20 mg, Oral, 2 Times Daily      gabapentin 100 MG capsule  Commonly known as:   NEURONTIN   100 mg, Oral, 3 Times Daily      glucosamine-chondroitin 500-400 MG capsule capsule   1 capsule, Oral, Daily      hydrALAZINE 25 MG tablet  Commonly known as:  APRESOLINE   25 mg, Oral, 3 Times Daily      MULTI-VITAMIN DAILY PO   1 tablet, Oral, Daily      mycophenolate 360 MG tablet delayed-release EC tablet  Commonly known as:  MYFORTIC   360 mg, Oral, 4 Times Daily      predniSONE 5 MG tablet  Commonly known as:  DELTASONE   5 mg, Oral, Daily      sertraline 50 MG tablet  Commonly known as:  ZOLOFT   50 mg, Oral, Daily      tacrolimus 1 MG capsule  Commonly known as:  PROGRAF   2 mg, Oral, 2 Times Daily             Discharge Diet: healthy heart    Activity at Discharge: avoid excessive movement of the left arm for 2 weeks     Discharge disposition:home    Follow-up Appointments  Dr. Carlson 2 weeks    Test Results Pending at Discharge       Sandor Carlson MD, [facility]    11/08/19  2:56 PM

## 2019-11-08 NOTE — H&P
Patient Care Team:  Rosa Blackmon APRN as PCP - General (Nurse Practitioner)  Barry Esquivel MD as PCP - Claims Attributed  Carol Mcbride MD as Consulting Physician (Nephrology)    Chief complaint   Presents for pacemaker implant      History of Present Illness   I had the pleasure of seeing Armando in the office today.  He is a very pleasant 69-year-old gentleman coronary artery disease, bradycardia, fatigue and renal and pancreas transplantation.  He did not have symptoms prior to his previous stent placement but had recently noted increased fatigue.  A nuclear study was performed which was abnormal.  He then underwent cardiac catheterization which demonstrated the left main to be normal.  The LAD had patent stents with 5 to 10% in-stent restenosis.  Circumflex gave rise to the marginal branches.  There is a focal 60% stenosis in the obtuse marginal branch.  RCA had a 40% lesion.     Armando is following up in the office today.  He does state that he is fatigued and his activity level is decreased.  He is not complaining of chest discomfort or any increased shortness of air.  His blood pressure is fairly well controlled in the office but I have asked him to start measuring this at home as well.  Armando is bradycardic in the office.  He has had bradycardia for a number of years but has tolerated it fairly well.  His rates however seem to be decreasing as he is frequently noted to have heart rates in the 40s.  I did have him ambulate in the mauricio with me to try to assess his chronotropic response.  His baseline heart rate was 51 bpm.  He did walk down the mauricio and back to his room and his heart rate remained in the low 50s.  I suspect that some of his fatigue (certainly not all) may be related to his sinus node dysfunction.    Review of Systems   Constitutional: Positive for fatigue.   HENT: Negative.    Eyes: Negative.    Respiratory: Positive for shortness of breath.    Cardiovascular: Negative  for chest pain and palpitations.   Gastrointestinal: Negative.    Endocrine: Negative.    Genitourinary: Negative.    Musculoskeletal: Negative.    Skin: Negative.    Allergic/Immunologic: Negative.    Neurological: Negative.    Hematological: Negative.    Psychiatric/Behavioral: Negative.           Past Medical History:   Diagnosis Date   • Bradycardia    • Coronary artery disease     May 2011: Vision bare-metal stent placed to the LAD.  December 2012: Xience drug-eluting stent placed to the LAD, overlapping the previous stent.   • Coronary artery disease     10/2009: Left main normal; mid LAD 30%; diagonal with minial disease. % stenosis, small vessel. RCA minimal disease. 5/2011: LAD with mid 80% stenosis and distal 40%. OM 2 with 30% stenosis. OM3, small with 80-90% stenosis RCA dominant with distal 20%. May 2011: Vision bare-metal stent to the LAD. 12/2012: Xience stent to the LAD overlapping previously placed stent. Stent placed to the OM2   • Diabetes mellitus (CMS/HCC)    • Hyperlipidemia    • Hypertension    • Sleep apnea     wears BiPap at night   • Stroke (CMS/HCC)        Objective      Vital Signs       Physical Exam   Constitutional: He appears well-developed and well-nourished.   HENT:   Head: Normocephalic and atraumatic.   Eyes: EOM are normal. Pupils are equal, round, and reactive to light.   Neck: Normal range of motion. Neck supple.   Cardiovascular: Normal rate and regular rhythm.   Pulmonary/Chest: Effort normal and breath sounds normal.   Abdominal: Soft. Bowel sounds are normal.   Musculoskeletal: Normal range of motion.   Skin: Skin is warm and dry.   Psychiatric: He has a normal mood and affect.       Results Review:    I reviewed the patient's new clinical results.      Assessment/Plan       Sinus node dysfunction (CMS/HCC)      Assessment & Plan     1.  Sinus node dysfunction  I suspect that Mr. Montano bradycardia is contributing to some of his fatigue.  He is not on any medications  that would lower his heart rate. Upon ambulation in the office, he did not have an appropriate chronotropic response.  I feel he would benefit from a pacemaker and I have discussed this with Dr. Otto Carlson.  The patient and his wife are willing to proceed and would like to have the procedure scheduled at [facility]     2.  Essential hypertension  His blood pressures fairly well controlled in the office today.  I have asked him to record some readings at home     3.  Dyslipidemia  He is on atorvastatin 40 mg daily     4.  CAD  Recent cardiac catheterization with nonobstructive disease and patent stent    I discussed the patients findings and my recommendations with patient    Sandor Carlson MD  11/08/19  5:24 AM

## 2019-11-08 NOTE — DISCHARGE INSTRUCTIONS
You may remove the dressing in 24 hours    You may shower in 3 days    Do not raise your left arm above your head for 2 weeks as directed by Dr. Carlson

## 2019-12-02 ENCOUNTER — OFFICE VISIT (OUTPATIENT)
Dept: CARDIOLOGY | Facility: CLINIC | Age: 69
End: 2019-12-02

## 2019-12-02 VITALS
WEIGHT: 240 LBS | HEART RATE: 66 BPM | BODY MASS INDEX: 34.36 KG/M2 | DIASTOLIC BLOOD PRESSURE: 60 MMHG | HEIGHT: 70 IN | SYSTOLIC BLOOD PRESSURE: 142 MMHG

## 2019-12-02 DIAGNOSIS — I49.5 SINUS NODE DYSFUNCTION (HCC): Primary | ICD-10-CM

## 2019-12-02 DIAGNOSIS — Z95.0 PRESENCE OF CARDIAC PACEMAKER: ICD-10-CM

## 2019-12-02 DIAGNOSIS — R00.1 BRADYCARDIA: ICD-10-CM

## 2019-12-02 PROCEDURE — 99024 POSTOP FOLLOW-UP VISIT: CPT | Performed by: INTERNAL MEDICINE

## 2019-12-02 PROCEDURE — 93288 INTERROG EVL PM/LDLS PM IP: CPT | Performed by: INTERNAL MEDICINE

## 2019-12-02 NOTE — PROGRESS NOTES
Subjective:     Encounter Date:12/02/2019      Patient ID: Armando Montano is a 69 y.o. male.    Chief Complaint:  Followup SSS and pacemaker    HPI:  Mr Montano is a 68 yo who presents for followup after a pacemaker was implanted for symptomatic sinus bradycardia.  He is a patient of Dr. Rosemarie Shell with a past medical  History is significant for HTN, HLD, DM, CAD and MATHEW.  He also has had a renal and pancreas transplant.  He developed symptoms of increased fatigue and poor exercise tolerance.  A nuclear stress test was abnormal and a subsequent cath showed patent stent in the LAD, 60% OM and 40% RCA.  His pulse rates were primarily in the 40's and he had chronotropic incompetence.    He had a pacemaker implanted 2 weeks ago.  Since then he has noted only marginal improvement in his symptoms.  He remains very sedentary due to his poor vision.    The following portions of the patient's history were reviewed and updated as appropriate: allergies, current medications, past family history, past medical history, past social history, past surgical history and problem list.    Problem List:  Patient Active Problem List   Diagnosis   • Stroke (CMS/[facility])   • Sleep apnea   • Hypertension   • Hyperlipidemia   • Diabetes mellitus (CMS/HCC)   • Coronary artery disease   • Bradycardia   • Status post insertion of drug eluting coronary artery stent   • History of simultaneous kidney and pancreas transplant (CMS/HCC)   • Abnormal nuclear stress test   • Sinus node dysfunction (CMS/HCC)   • Presence of cardiac pacemaker       Past Medical History:  Past Medical History:   Diagnosis Date   • Bradycardia    • Coronary artery disease     May 2011: Vision bare-metal stent placed to the LAD.  December 2012: Xience drug-eluting stent placed to the LAD, overlapping the previous stent.   • Coronary artery disease     10/2009: Left main normal; mid LAD 30%; diagonal with minial disease. % stenosis, small vessel. RCA  minimal disease. 5/2011: LAD with mid 80% stenosis and distal 40%. OM 2 with 30% stenosis. OM3, small with 80-90% stenosis RCA dominant with distal 20%. May 2011: Vision bare-metal stent to the LAD. 12/2012: Xience stent to the LAD overlapping previously placed stent. Stent placed to the OM2   • Diabetes mellitus (CMS/HCC)    • Hyperlipidemia    • Hypertension    • Sleep apnea     wears BiPap at night   • Stroke (CMS/HCC)        Past Surgical History:  Past Surgical History:   Procedure Laterality Date   • APPENDECTOMY     • CARDIAC CATHETERIZATION     • CARDIAC CATHETERIZATION N/A 9/23/2019    Procedure: Left Heart Cath;  Surgeon: Yanick Pereira MD;  Location: [facility] CATH INVASIVE LOCATION;  Service: Cardiology   • CARDIAC ELECTROPHYSIOLOGY PROCEDURE N/A 11/8/2019    Procedure: Pacemaker DC new- [facility];  Surgeon: Sandor Carlson MD;  Location:  [facility] CATH INVASIVE LOCATION;  Service: Cardiology   • CHOLECYSTECTOMY     • COMBINED KIDNEY-PANCREAS TRANSPLANT     • CORONARY STENT PLACEMENT      May 2011: Vision bare-metal stent to the LAD. December 2012: Xience stent to the LAD overlapping the previously placed stent. Stent placed to the OM2.   • TONSILLECTOMY AND ADENOIDECTOMY         Social History:  Social History     Socioeconomic History   • Marital status:      Spouse name: Not on file   • Number of children: Not on file   • Years of education: Not on file   • Highest education level: Not on file   Tobacco Use   • Smoking status: Never Smoker   • Smokeless tobacco: Never Used   Substance and Sexual Activity   • Alcohol use: Yes     Frequency: Never     Comment: rarely   • Drug use: No   • Sexual activity: Defer       Allergies:  Allergies   Allergen Reactions   • Bactrim  [Sulfamethoxazole-Trimethoprim] Unknown (See Comments)   • Furosemide Unknown (See Comments)   • Rosuvastatin Calcium Unknown (See Comments)       Immunizations:    There is no immunization history on file for this  "patient.    ROS:  Review of Systems   Constitution: Positive for malaise/fatigue.   Cardiovascular: Negative for chest pain, irregular heartbeat and palpitations.   Respiratory: Negative for shortness of breath.    Psychiatric/Behavioral: Positive for depression.   All other systems reviewed and are negative.         Objective:         /60   Pulse 66   Ht 177.8 cm (70\")   Wt 109 kg (240 lb)   BMI 34.44 kg/m²     Physical Exam   Constitutional: He is oriented to person, place, and time. He appears well-developed and well-nourished. No distress.   HENT:   Head: Normocephalic and atraumatic.   Eyes: Conjunctivae and EOM are normal. Pupils are equal, round, and reactive to light. No scleral icterus.   Neck: Normal range of motion. No thyromegaly present.   Cardiovascular: Normal rate, regular rhythm and normal heart sounds.   Pulmonary/Chest: Effort normal and breath sounds normal.   Abdominal: Soft. Bowel sounds are normal.   Musculoskeletal: Normal range of motion.   Neurological: He is alert and oriented to person, place, and time.   Skin: Skin is warm and dry.   Pacemaker site well healed   Psychiatric: He has a normal mood and affect.       In-Office Procedure(s):  Procedures Pacemaker eval interpreted by [facility] [id number]  Battery QUAN    P wave 5mv  Threshold 0.5V  Impedance 380 ohms    R wave 12mv  Threshold 0.6V  Impedance 550 ohms    Events - 96% A paced, no AF    ASCVD RIsk Score::  The ASCVD Risk score (Mitchel DC Jr., et al., 2013) failed to calculate for the following reasons:    Cannot find a previous HDL lab    Cannot find a previous total cholesterol lab    Recent Radiology:  Imaging Results (Most Recent)     None          Lab Review:   Lab on 11/06/2019   Component Date Value   • Glucose 11/06/2019 91    • BUN 11/06/2019 24*   • Creatinine 11/06/2019 0.95    • Sodium 11/06/2019 145    • Potassium 11/06/2019 4.4    • Chloride 11/06/2019 111*   • CO2 11/06/2019 25.3    • Calcium 11/06/2019 8.9    • eGFR " Non  Amer 11/06/2019 79    • BUN/Creatinine Ratio 11/06/2019 25.3*   • Anion Gap 11/06/2019 8.7    • WBC 11/06/2019 6.81    • RBC 11/06/2019 3.86*   • Hemoglobin 11/06/2019 13.1    • Hematocrit 11/06/2019 37.5    • MCV 11/06/2019 97.2*   • MCH 11/06/2019 33.9*   • MCHC 11/06/2019 34.9    • RDW 11/06/2019 13.3    • RDW-SD 11/06/2019 47.6    • MPV 11/06/2019 11.8    • Platelets 11/06/2019 176    • Neutrophil % 11/06/2019 82.0*   • Lymphocyte % 11/06/2019 7.5*   • Monocyte % 11/06/2019 9.1    • Eosinophil % 11/06/2019 0.7    • Basophil % 11/06/2019 0.4    • Immature Grans % 11/06/2019 0.3    • Neutrophils, Absolute 11/06/2019 5.58    • Lymphocytes, Absolute 11/06/2019 0.51*   • Monocytes, Absolute 11/06/2019 0.62    • Eosinophils, Absolute 11/06/2019 0.05    • Basophils, Absolute 11/06/2019 0.03    • Immature Grans, Absolute 11/06/2019 0.02    • nRBC 11/06/2019 0.0    Lab on 09/20/2019   Component Date Value   • Protime 09/20/2019 12.0*   • INR 09/20/2019 1.20*   • PTT 09/20/2019 22.9*   • Glucose 09/20/2019 88    • BUN 09/20/2019 19    • Creatinine 09/20/2019 1.00    • Sodium 09/20/2019 141    • Potassium 09/20/2019 3.8    • Chloride 09/20/2019 110    • CO2 09/20/2019 24.0    • Calcium 09/20/2019 8.5*   • eGFR Non  Amer 09/20/2019 75    • BUN/Creatinine Ratio 09/20/2019 19.0    • Anion Gap 09/20/2019 10.8    • WBC 09/20/2019 5.90    • RBC 09/20/2019 4.06*   • Hemoglobin 09/20/2019 13.4    • Hematocrit 09/20/2019 41.0    • MCV 09/20/2019 100.9*   • MCH 09/20/2019 33.1*   • MCHC 09/20/2019 32.8    • RDW 09/20/2019 14.8    • RDW-SD 09/20/2019 52.1    • MPV 09/20/2019 9.8    • Platelets 09/20/2019 165    • Neutrophil % 09/20/2019 78.5*   • Lymphocyte % 09/20/2019 10.4*   • Monocyte % 09/20/2019 9.5    • Eosinophil % 09/20/2019 1.1    • Basophil % 09/20/2019 0.5    • Neutrophils, Absolute 09/20/2019 4.60    • Lymphocytes, Absolute 09/20/2019 0.60*   • Monocytes, Absolute 09/20/2019 0.60    • Eosinophils,  Absolute 09/20/2019 0.10    • Basophils, Absolute 09/20/2019 0.00    • nRBC 09/20/2019 0.1    Hospital Outpatient Visit on 09/11/2019   Component Date Value   • BH CV STRESS PROTOCOL 1 09/11/2019 Pharmacologic    • Stage 1 09/11/2019 1    • HR Stage 1 09/11/2019 64    • BP Stage 1 09/11/2019 194/70    • Duration Min Stage 1 09/11/2019 0    • Duration Sec Stage 1 09/11/2019 10    • Stress Dose Regadenoson * 09/11/2019 0.4    • Stress Comments Stage 1 09/11/2019 10 sec bolus injection    • Stage 2 09/11/2019 2    • HR Stage 2 09/11/2019 67    • BP Stage 2 09/11/2019 140/50    • Duration Min Stage 2 09/11/2019 4    • Duration Sec Stage 2 09/11/2019 0    • Stress Comments Stage 2 09/11/2019 recovery    • Stage 3 09/11/2019 3    • HR Stage 3 09/11/2019 64    • BP Stage 3 09/11/2019 140/50    • Stage 4 09/11/2019 4    • HR Stage 4 09/11/2019 61    • BP Stage 4 09/11/2019 144/56    • Baseline HR 09/11/2019 55    • Baseline BP 09/11/2019 194/70    • Peak HR 09/11/2019 67    • Percent Max Pred HR 09/11/2019 43.51    • Percent Target HR 09/11/2019 51    • Peak BP 09/11/2019 140/50    • Recovery HR 09/11/2019 61    • Recovery BP 09/11/2019 159/55    • Target HR (85%) 09/11/2019 131    • Max. Pred. HR (100%) 09/11/2019 154    • Nuclear Prior Study 09/11/2019 3                 Assessment:          Diagnosis Plan   1. Sinus node dysfunction (CMS/HCC)     2. Bradycardia     3. Presence of cardiac pacemaker            Plan:      1. SSS - s/p pacemaker  2. Pacemaker followup - wound well healed, normal device function, enrolled in remote monitoring    RTC 1 year      Level of Care:                 Sandor Carlson MD  12/02/19  .

## 2019-12-04 PROBLEM — Z95.0 PRESENCE OF CARDIAC PACEMAKER: Status: ACTIVE | Noted: 2019-12-04

## 2020-08-10 PROCEDURE — 93294 REM INTERROG EVL PM/LDLS PM: CPT | Performed by: INTERNAL MEDICINE

## 2020-08-10 PROCEDURE — 93296 REM INTERROG EVL PM/IDS: CPT | Performed by: INTERNAL MEDICINE

## 2020-09-15 DIAGNOSIS — I25.10 CORONARY ARTERY DISEASE INVOLVING NATIVE CORONARY ARTERY OF NATIVE HEART WITHOUT ANGINA PECTORIS: Primary | ICD-10-CM

## 2020-09-15 RX ORDER — CLOPIDOGREL BISULFATE 75 MG/1
75 TABLET ORAL DAILY
Qty: 90 TABLET | Refills: 3 | Status: SHIPPED | OUTPATIENT
Start: 2020-09-15 | End: 2021-08-26

## 2021-04-20 ENCOUNTER — TELEPHONE (OUTPATIENT)
Dept: CARDIOLOGY | Facility: CLINIC | Age: 71
End: 2021-04-20

## 2021-04-20 NOTE — TELEPHONE ENCOUNTER
2/8/21 remote transmission has 25 AMS noted and episodes 20,21,22,23,24 indicating loss of capture. Called tech services at [employer], he will analyze and get back to me, probably tomorrow.

## 2021-04-22 NOTE — TELEPHONE ENCOUNTER
Tech services reviewed, states looks like AMS is retrograde conduction. LOC on these events are from auto capture ventricular testing.

## 2021-06-01 PROBLEM — Z95.0 PRESENCE OF CARDIAC PACEMAKER: Chronic | Status: ACTIVE | Noted: 2019-12-04

## 2021-06-02 ENCOUNTER — OFFICE VISIT (OUTPATIENT)
Dept: CARDIOLOGY | Facility: CLINIC | Age: 71
End: 2021-06-02

## 2021-06-02 VITALS
BODY MASS INDEX: 34.36 KG/M2 | SYSTOLIC BLOOD PRESSURE: 138 MMHG | DIASTOLIC BLOOD PRESSURE: 78 MMHG | WEIGHT: 240 LBS | HEART RATE: 60 BPM | HEIGHT: 70 IN

## 2021-06-02 DIAGNOSIS — I10 ESSENTIAL HYPERTENSION: Chronic | ICD-10-CM

## 2021-06-02 DIAGNOSIS — I25.10 CORONARY ARTERY DISEASE INVOLVING NATIVE CORONARY ARTERY OF NATIVE HEART WITHOUT ANGINA PECTORIS: Primary | Chronic | ICD-10-CM

## 2021-06-02 DIAGNOSIS — Z95.0 PRESENCE OF CARDIAC PACEMAKER: Chronic | ICD-10-CM

## 2021-06-02 DIAGNOSIS — E78.5 DYSLIPIDEMIA: Chronic | ICD-10-CM

## 2021-06-02 PROCEDURE — 93288 INTERROG EVL PM/LDLS PM IP: CPT | Performed by: INTERNAL MEDICINE

## 2021-06-02 PROCEDURE — 99214 OFFICE O/P EST MOD 30 MIN: CPT | Performed by: INTERNAL MEDICINE

## 2021-06-02 NOTE — PROGRESS NOTES
"Chief Complaint  Sinus node dysfunction    Subjective    History of Present Illness     I had the pleasure of seeing Armando in the office today.  He is a very pleasant 70-year-old gentleman coronary artery disease, bradycardia, fatigue and renal and pancreas transplantation.  He did not have symptoms prior to his previous stent placement but had recently noted increased fatigue.  A nuclear study was performed which was abnormal.  He then underwent cardiac catheterization which demonstrated the left main to be normal.  The LAD had patent stents with 5 to 10% in-stent restenosis.  Circumflex gave rise to the marginal branches.  There is a focal 60% stenosis in the obtuse marginal branch.  RCA had a 40% lesion.  He is status post pacemaker implantation in November 2019 secondary to sinus node dysfunction    In the office today he is not complaining of chest discomfort or shortness of air.  His blood pressure is fairly well controlled.  No significant lower extremity edema, orthopnea or paroxysmal nocturnal dyspnea.  He is not experiencing palpitations, dizziness or near syncope.  He is having difficulty with his eyesight.      Objective   Vital Signs:   /78   Pulse 60   Ht 177.8 cm (70\")   Wt 109 kg (240 lb)   BMI 34.44 kg/m²     Vitals and nursing note reviewed.   Constitutional:       General: Not in acute distress.     Appearance: Well-developed. Not diaphoretic.      Comments: Pleasant gentleman no acute distress   Eyes:      General: No scleral icterus.     Conjunctiva/sclera: Conjunctivae normal.      Pupils: Pupils are equal, round, and reactive to light.   HENT:      Head: Normocephalic and atraumatic.   Neck:      Thyroid: No thyromegaly.      Lymphadenopathy: No cervical adenopathy.   Pulmonary:      Effort: Pulmonary effort is normal. No respiratory distress.      Breath sounds: Normal breath sounds. No wheezing. No rales.   Cardiovascular:      PMI at left midclavicular line. Normal rate. Regular " rhythm. Normal S1. Normal S2.      Murmurs: There is no murmur.      No gallop. No S3 and S4 gallop. No click. No rub.   Pulses:     Intact distal pulses. No decreased pulses.   Edema:     Peripheral edema absent.   Abdominal:      General: Bowel sounds are normal. There is no distension.      Palpations: Abdomen is soft. There is no abdominal mass.      Tenderness: There is no abdominal tenderness. There is no guarding or rebound.   Musculoskeletal: Normal range of motion.      Cervical back: Normal range of motion and neck supple. Skin:     General: Skin is warm and dry.      Coloration: Skin is not pale.      Findings: No rash.   Neurological:      Mental Status: Alert, oriented to person, place, and time and oriented to person, place and time.      Coordination: Coordination normal.      Gait: Gait is intact.   Psychiatric:         Behavior: Behavior normal.         Result Review :   The following data was reviewed by: Rosemarie Shell MD on 06/02/2021:                        Assessment and Plan    1. Coronary artery disease involving native coronary artery of native heart without angina pectoris  He is stable from a cardiac standpoint.  He is not having symptoms of angina.  He is on aspirin, Plavix, atorvastatin.    2. Essential hypertension  His blood pressure is fairly well controlled.    3. Dyslipidemia  He is on atorvastatin 40 mg daily    4. Presence of cardiac pacemaker  Interrogation of his device demonstrated normal function.  He is pacing 83% of the time in the atrium and 28% of time in the ventricle.  Pacemaker reads 152 mode switches.  This appears to be far field and no evidence of atrial fibrillation is present.    Overall, Armando is stable from a cardiovascular standpoint.  His pacemaker is functioning normally.  I have not changed his medications.  He is scheduled to have lab work and a couple of weeks.  His potassium was 3.4 on his last lab work in March 2021.  If his potassium remains low he  will need some mild supplementation        Follow Up   No follow-ups on file.  Patient was given instructions and counseling regarding his condition or for health maintenance advice. Please see specific information pulled into the AVS if appropriate.

## 2021-07-05 PROCEDURE — 93294 REM INTERROG EVL PM/LDLS PM: CPT | Performed by: INTERNAL MEDICINE

## 2021-07-05 PROCEDURE — 93296 REM INTERROG EVL PM/IDS: CPT | Performed by: INTERNAL MEDICINE

## 2021-08-23 ENCOUNTER — OFFICE VISIT (OUTPATIENT)
Dept: CARDIOLOGY | Facility: CLINIC | Age: 71
End: 2021-08-23

## 2021-08-23 DIAGNOSIS — Z94.0 HISTORY OF SIMULTANEOUS KIDNEY AND PANCREAS TRANSPLANT (HCC): ICD-10-CM

## 2021-08-23 DIAGNOSIS — I49.5 SINUS NODE DYSFUNCTION (HCC): Primary | ICD-10-CM

## 2021-08-23 DIAGNOSIS — I25.10 CORONARY ARTERY DISEASE INVOLVING NATIVE CORONARY ARTERY OF NATIVE HEART WITHOUT ANGINA PECTORIS: Chronic | ICD-10-CM

## 2021-08-23 DIAGNOSIS — Z94.83 HISTORY OF SIMULTANEOUS KIDNEY AND PANCREAS TRANSPLANT (HCC): ICD-10-CM

## 2021-08-23 DIAGNOSIS — Z95.0 PRESENCE OF CARDIAC PACEMAKER: Chronic | ICD-10-CM

## 2021-08-23 PROCEDURE — 99212 OFFICE O/P EST SF 10 MIN: CPT | Performed by: INTERNAL MEDICINE

## 2021-08-23 PROCEDURE — 93288 INTERROG EVL PM/LDLS PM IP: CPT | Performed by: INTERNAL MEDICINE

## 2021-08-23 NOTE — PROGRESS NOTES
Subjective:     Encounter Date:08/23/2021      Patient ID: Armando Montano is a 70 y.o. male.    Chief Complaint:  No chief complaint on file.      HPI:  Mr Montano is a 68 yo who presents for followup after a pacemaker was implanted for symptomatic sinus bradycardia.  He is a patient of Dr. Rosemarie Shell with a past medical  History is significant for HTN, HLD, DM, CAD and MATHEW.  He also has had a renal and pancreas transplant.  He developed symptoms of increased fatigue and poor exercise tolerance.  A nuclear stress test was abnormal and a subsequent cath showed patent stent in the LAD, 60% OM and 40% RCA.  His pulse rates were primarily in the 40's and he had chronotropic incompetence.  He had a pacemaker implanted in 2020.     Since he was last seen, he has been doing well without symptoms of palpitations, chest pain or dyspnea.      The following portions of the patient's history were reviewed and updated as appropriate: allergies, current medications, past family history, past medical history, past social history, past surgical history and problem list.    Problem List:  Patient Active Problem List   Diagnosis   • Stroke (CMS/[facility])   • Sleep apnea   • Hypertension   • Dyslipidemia   • Diabetes mellitus (CMS/HCC)   • Coronary artery disease   • Bradycardia   • Status post insertion of drug eluting coronary artery stent   • History of simultaneous kidney and pancreas transplant (CMS/HCC)   • Abnormal nuclear stress test   • Sinus node dysfunction (CMS/HCC)   • Presence of cardiac pacemaker       Past Medical History:  Past Medical History:   Diagnosis Date   • Bradycardia    • Coronary artery disease     May 2011: Vision bare-metal stent placed to the LAD.  December 2012: Xience drug-eluting stent placed to the LAD, overlapping the previous stent.   • Coronary artery disease     10/2009: Left main normal; mid LAD 30%; diagonal with minial disease. % stenosis, small vessel. RCA minimal disease.  5/2011: LAD with mid 80% stenosis and distal 40%. OM 2 with 30% stenosis. OM3, small with 80-90% stenosis RCA dominant with distal 20%. May 2011: Vision bare-metal stent to the LAD. 12/2012: Xience stent to the LAD overlapping previously placed stent. Stent placed to the OM2   • Diabetes mellitus (CMS/HCC)    • Hyperlipidemia    • Hypertension    • Sleep apnea     wears BiPap at night   • Stroke (CMS/HCC)        Past Surgical History:  Past Surgical History:   Procedure Laterality Date   • APPENDECTOMY     • CARDIAC CATHETERIZATION     • CARDIAC CATHETERIZATION N/A 9/23/2019    Procedure: Left Heart Cath;  Surgeon: Yanick Pereira MD;  Location: [facility] CATH INVASIVE LOCATION;  Service: Cardiology   • CARDIAC ELECTROPHYSIOLOGY PROCEDURE N/A 11/8/2019    Procedure: Pacemaker DC new- [facility];  Surgeon: Sandor Carlson MD;  Location:  [facility] CATH INVASIVE LOCATION;  Service: Cardiology   • CHOLECYSTECTOMY     • COMBINED KIDNEY-PANCREAS TRANSPLANT     • CORONARY STENT PLACEMENT      May 2011: Vision bare-metal stent to the LAD. December 2012: Xience stent to the LAD overlapping the previously placed stent. Stent placed to the OM2.   • TONSILLECTOMY AND ADENOIDECTOMY         Social History:  Social History     Socioeconomic History   • Marital status:      Spouse name: Not on file   • Number of children: Not on file   • Years of education: Not on file   • Highest education level: Not on file   Tobacco Use   • Smoking status: Never Smoker   • Smokeless tobacco: Never Used   Substance and Sexual Activity   • Alcohol use: Yes     Comment: rarely   • Drug use: No   • Sexual activity: Defer       Allergies:  Allergies   Allergen Reactions   • Furosemide Rash   • Rosuvastatin Calcium Rash and Myalgia   • Sulfamethoxazole-Trimethoprim Unknown (See Comments) and Rash       Immunizations:    There is no immunization history on file for this patient.    ROS:  Review of Systems   Constitutional: Negative for  malaise/fatigue.   Cardiovascular: Negative for chest pain, dyspnea on exertion, irregular heartbeat, leg swelling, near-syncope, orthopnea, palpitations, paroxysmal nocturnal dyspnea and syncope.   Respiratory: Negative for shortness of breath.    All other systems reviewed and are negative.         Objective:         There were no vitals taken for this visit.    Constitutional:       General: Not in acute distress.     Appearance: Well-developed.   Eyes:      General: No scleral icterus.     Conjunctiva/sclera: Conjunctivae normal.      Pupils: Pupils are equal, round, and reactive to light.   HENT:      Head: Normocephalic and atraumatic.   Neck:      Thyroid: No thyromegaly.   Pulmonary:      Effort: Pulmonary effort is normal.      Breath sounds: Normal breath sounds.   Cardiovascular:      Normal rate. Regular rhythm.   Abdominal:      General: Bowel sounds are normal.      Palpations: Abdomen is soft.   Musculoskeletal: Normal range of motion.      Cervical back: Normal range of motion. Skin:     General: Skin is warm and dry.   Neurological:      Mental Status: Alert and oriented to person, place, and time.         In-Office Procedure(s):  Procedures  Pacemaker Eval interpreted by [facility] [id number]  Battery QUAN    P wave 4mv  Threshold 0.6V  Impedance 450 ohms    R wave 12mv  Threshold 0.8V  Impedance 550 ohms    Events - none    ASCVD RIsk Score::  The ASCVD Risk score (Mitchel DC Jr., et al., 2013) failed to calculate for the following reasons:    The patient has a prior MI or stroke diagnosis    Recent Radiology:  Imaging Results (Most Recent)     None          Lab Review:   No visits with results within 2 Month(s) from this visit.   Latest known visit with results is:   Lab on 11/06/2019   Component Date Value   • Glucose 11/06/2019 91    • BUN 11/06/2019 24*   • Creatinine 11/06/2019 0.95    • Sodium 11/06/2019 145    • Potassium 11/06/2019 4.4    • Chloride 11/06/2019 111*   • CO2 11/06/2019 25.3    • Calcium  11/06/2019 8.9    • eGFR Non  Amer 11/06/2019 79    • BUN/Creatinine Ratio 11/06/2019 25.3*   • Anion Gap 11/06/2019 8.7    • WBC 11/06/2019 6.81    • RBC 11/06/2019 3.86*   • Hemoglobin 11/06/2019 13.1    • Hematocrit 11/06/2019 37.5    • MCV 11/06/2019 97.2*   • MCH 11/06/2019 33.9*   • MCHC 11/06/2019 34.9    • RDW 11/06/2019 13.3    • RDW-SD 11/06/2019 47.6    • MPV 11/06/2019 11.8    • Platelets 11/06/2019 176    • Neutrophil % 11/06/2019 82.0*   • Lymphocyte % 11/06/2019 7.5*   • Monocyte % 11/06/2019 9.1    • Eosinophil % 11/06/2019 0.7    • Basophil % 11/06/2019 0.4    • Immature Grans % 11/06/2019 0.3    • Neutrophils, Absolute 11/06/2019 5.58    • Lymphocytes, Absolute 11/06/2019 0.51*   • Monocytes, Absolute 11/06/2019 0.62    • Eosinophils, Absolute 11/06/2019 0.05    • Basophils, Absolute 11/06/2019 0.03    • Immature Grans, Absolute 11/06/2019 0.02    • nRBC 11/06/2019 0.0                 Assessment:          Diagnosis Plan   1. Sinus node dysfunction (CMS/HCC)     2. Presence of cardiac pacemaker     3. Coronary artery disease involving native coronary artery of native heart without angina pectoris     4. History of simultaneous kidney and pancreas transplant (CMS/HCC)            Plan:         1. SSS - s/p pacemaker  2. Pacemaker followup -  normal device function, enrolled in remote monitoring     RTC 1 year      Level of Care:                 Sandor Carlson MD  08/23/21  .

## 2021-08-24 VITALS
HEART RATE: 60 BPM | BODY MASS INDEX: 33.36 KG/M2 | WEIGHT: 233 LBS | HEIGHT: 70 IN | SYSTOLIC BLOOD PRESSURE: 132 MMHG | DIASTOLIC BLOOD PRESSURE: 64 MMHG

## 2021-08-26 DIAGNOSIS — I25.10 CORONARY ARTERY DISEASE INVOLVING NATIVE CORONARY ARTERY OF NATIVE HEART WITHOUT ANGINA PECTORIS: ICD-10-CM

## 2021-08-26 RX ORDER — CLOPIDOGREL BISULFATE 75 MG/1
75 TABLET ORAL DAILY
Qty: 90 TABLET | Refills: 3 | Status: SHIPPED | OUTPATIENT
Start: 2021-08-26 | End: 2022-09-12

## 2021-12-01 ENCOUNTER — TELEMEDICINE (OUTPATIENT)
Dept: CARDIOLOGY | Facility: CLINIC | Age: 71
End: 2021-12-01

## 2021-12-01 VITALS
DIASTOLIC BLOOD PRESSURE: 80 MMHG | SYSTOLIC BLOOD PRESSURE: 191 MMHG | HEIGHT: 70 IN | BODY MASS INDEX: 33.64 KG/M2 | HEART RATE: 61 BPM | WEIGHT: 235 LBS

## 2021-12-01 DIAGNOSIS — I25.10 CORONARY ARTERY DISEASE INVOLVING NATIVE CORONARY ARTERY OF NATIVE HEART WITHOUT ANGINA PECTORIS: Primary | Chronic | ICD-10-CM

## 2021-12-01 DIAGNOSIS — Z95.0 PRESENCE OF CARDIAC PACEMAKER: Chronic | ICD-10-CM

## 2021-12-01 DIAGNOSIS — I10 PRIMARY HYPERTENSION: Chronic | ICD-10-CM

## 2021-12-01 DIAGNOSIS — E78.5 DYSLIPIDEMIA: Chronic | ICD-10-CM

## 2021-12-01 PROCEDURE — 99214 OFFICE O/P EST MOD 30 MIN: CPT | Performed by: INTERNAL MEDICINE

## 2021-12-01 NOTE — PROGRESS NOTES
"Chief Complaint  Coronary Artery Disease    Subjective    History of Present Illness    Unable to complete visit using a video connection to the patient. A phone visit was used to complete this visits. Total time of discussion was 15 minutes.  I attempted a video visit with Mr. Montano today.  Our connection failed and therefore, it was a phone visit.  He is a pleasant 71-year-old gentleman with known coronary disease.  He is status post stent placement to the LAD and OM 2.  He has a pacemaker implanted secondary to bradycardia.  He has had a renal and pancreas transplant.  He underwent cardiac catheterization and 2019 secondary to an abnormal nuclear study.  His left main was normal.  The LAD had patent stents with 5 to 10% in-stent restenosis.  Circumflex gave rise to marginal branches.  There was a focal 60% stenosis in the obtuse marginal branch.  Right coronary artery had a 40% lesion.    He has states that he has been having some bilateral arm discomfort.  He describes this as a pulling sensation and can last for a few hours.  There are no precipitating factors.  There is no associated shortness of air, diaphoresis or nausea.  There are no particular measures that will relieve his symptoms.  His blood pressure is elevated today while sitting.  It does drop precipitously when standing.  He states that he becomes dizzy if he stands for a long period of time without moving his legs.  No complaints of headache.  He states his blood pressure is generally fairly well controlled.  He also admits he does not take his blood pressure on a routine basis.  No lower extremity edema, orthopnea or paroxysmal nocturnal dyspnea.  No palpitations.    Objective   Vital Signs:   BP (!) 191/80 (Patient Position: Sitting)   Pulse 61   Ht 177.8 cm (70\")   Wt 107 kg (235 lb)   BMI 33.72 kg/m²     Physical Exam   Unable to perform physical exam secondary to telephone visit  Result Review :   The following data was reviewed by: " "Rosemarie Shell MD on 12/01/2021:                          Assessment and Plan    1. Coronary artery disease involving native coronary artery of native heart without angina pectoris  Underlying known coronary disease.  Cardiac catheterization 2019 revealed patent stents in the LAD with 5 to 10% in-stent restenosis.  The circumflex had a focal 60% lesion.  There was a 40% lesion in the midportion of the right coronary artery.  He is having symptoms of bilateral arm discomfort which he describes as a \"pulling\" sensation.  The symptoms are somewhat atypical for angina however he has never had chest discomfort in the face of known coronary disease.  His last nuclear study was 2019.  I will repeat this study.    2. Primary hypertension  His blood pressure is elevated while sitting today.  He states that this is unusual for him.  He is asymptomatic.  I have asked him to take his blood pressure routinely for the next 2 weeks.  He will take his pressure sitting and standing and let me know the results.    3. Dyslipidemia  He is on atorvastatin.  He had a total cholesterol in September 2021 which was 133.  His triglycerides were 55.  He does need a full lipid panel to see his LDL.  His LDL goal is less than 70.    4. Presence of cardiac pacemaker  His last pacemaker check was November 2021.  There is appropriate function of his device.  He has 9 years remaining on his battery.  He is pacing 95% in the atrium and 28% the ventricle.    Overall, Armando needs reevaluation for coronary disease.  He is having bilateral arm discomfort which is somewhat atypical for myocardial ischemia.  I will order a nuclear stress test for further evaluation.  He is going to monitor his blood pressure in the sitting and standing position and let me know the readings in a couple weeks.  I will see if I need to adjust his medications.    Length of telephone visit was 15 minutes.  EMR documentation was 15 minutes.        Follow Up   No follow-ups " on file.  Patient was given instructions and counseling regarding his condition or for health maintenance advice. Please see specific information pulled into the AVS if appropriate.

## 2021-12-23 ENCOUNTER — HOSPITAL ENCOUNTER (OUTPATIENT)
Dept: NUCLEAR MEDICINE | Facility: HOSPITAL | Age: 71
Discharge: HOME | End: 2021-12-23

## 2021-12-23 DIAGNOSIS — I25.10 CORONARY ARTERY DISEASE INVOLVING NATIVE CORONARY ARTERY OF NATIVE HEART WITHOUT ANGINA PECTORIS: ICD-10-CM

## 2021-12-23 LAB
BH CV REST NUCLEAR ISOTOPE DOSE: 7.9 MCI
BH CV STRESS BP STAGE 1: NORMAL
BH CV STRESS BP STAGE 2: NORMAL
BH CV STRESS COMMENTS STAGE 1: NORMAL
BH CV STRESS COMMENTS STAGE 2: NORMAL
BH CV STRESS DOSE REGADENOSON STAGE 1: 0.4
BH CV STRESS DURATION MIN STAGE 1: 0
BH CV STRESS DURATION MIN STAGE 2: 4
BH CV STRESS DURATION SEC STAGE 1: 10
BH CV STRESS DURATION SEC STAGE 2: 0
BH CV STRESS HR STAGE 1: 69
BH CV STRESS HR STAGE 2: 60
BH CV STRESS NUCLEAR ISOTOPE DOSE: 21.8 MCI
BH CV STRESS PROTOCOL 1: NORMAL
BH CV STRESS RECOVERY BP: NORMAL MMHG
BH CV STRESS RECOVERY HR: 60 BPM
BH CV STRESS STAGE 1: 1
BH CV STRESS STAGE 2: 2
LV EF NUC BP: 58 %
MAXIMAL PREDICTED HEART RATE: 151 BPM
PERCENT MAX PREDICTED HR: 41.06 %
STRESS BASELINE BP: NORMAL MMHG
STRESS BASELINE HR: 60 BPM
STRESS PERCENT HR: 48 %
STRESS POST PEAK BP: NORMAL MMHG
STRESS POST PEAK HR: 62 BPM
STRESS TARGET HR: 128 BPM

## 2021-12-23 PROCEDURE — 93018 CV STRESS TEST I&R ONLY: CPT | Performed by: INTERNAL MEDICINE

## 2021-12-23 PROCEDURE — 78452 HT MUSCLE IMAGE SPECT MULT: CPT

## 2021-12-23 PROCEDURE — 78452 HT MUSCLE IMAGE SPECT MULT: CPT | Performed by: INTERNAL MEDICINE

## 2021-12-23 PROCEDURE — 0 TECHNETIUM TETROFOSMIN KIT: Performed by: INTERNAL MEDICINE

## 2021-12-23 PROCEDURE — A9502 TC99M TETROFOSMIN: HCPCS | Performed by: INTERNAL MEDICINE

## 2021-12-23 PROCEDURE — 25010000002 REGADENOSON 0.4 MG/5ML SOLUTION: Performed by: INTERNAL MEDICINE

## 2021-12-23 PROCEDURE — 93017 CV STRESS TEST TRACING ONLY: CPT

## 2021-12-23 PROCEDURE — 93016 CV STRESS TEST SUPVJ ONLY: CPT | Performed by: INTERNAL MEDICINE

## 2021-12-23 RX ADMIN — REGADENOSON 0.4 MG: 0.08 INJECTION, SOLUTION INTRAVENOUS at 11:23

## 2021-12-23 RX ADMIN — TETROFOSMIN 1 DOSE: 1.38 INJECTION, POWDER, LYOPHILIZED, FOR SOLUTION INTRAVENOUS at 09:15

## 2021-12-23 RX ADMIN — TETROFOSMIN 1 DOSE: 1.38 INJECTION, POWDER, LYOPHILIZED, FOR SOLUTION INTRAVENOUS at 11:23

## 2022-01-03 PROCEDURE — 93296 REM INTERROG EVL PM/IDS: CPT | Performed by: INTERNAL MEDICINE

## 2022-01-03 PROCEDURE — 93294 REM INTERROG EVL PM/LDLS PM: CPT | Performed by: INTERNAL MEDICINE

## 2022-01-27 ENCOUNTER — APPOINTMENT (OUTPATIENT)
Dept: GENERAL RADIOLOGY | Facility: HOSPITAL | Age: 72
End: 2022-01-27

## 2022-01-27 ENCOUNTER — HOSPITAL ENCOUNTER (EMERGENCY)
Facility: HOSPITAL | Age: 72
Discharge: HOME | End: 2022-01-28
Admitting: EMERGENCY MEDICINE

## 2022-01-27 DIAGNOSIS — S91.114A LACERATION OF THIRD TOE OF RIGHT FOOT, INITIAL ENCOUNTER: ICD-10-CM

## 2022-01-27 DIAGNOSIS — S92.911B: Primary | ICD-10-CM

## 2022-01-27 DIAGNOSIS — S91.116A LACERATION OF FOURTH TOE: ICD-10-CM

## 2022-01-27 PROCEDURE — 90715 TDAP VACCINE 7 YRS/> IM: CPT | Performed by: PHYSICIAN ASSISTANT

## 2022-01-27 PROCEDURE — 25010000002 TETANUS-DIPHTH-ACELL PERTUSSIS 5-2.5-18.5 LF-MCG/0.5 SUSPENSION PREFILLED SYRINGE: Performed by: PHYSICIAN ASSISTANT

## 2022-01-27 PROCEDURE — 90471 IMMUNIZATION ADMIN: CPT | Performed by: PHYSICIAN ASSISTANT

## 2022-01-27 PROCEDURE — 99282 EMERGENCY DEPT VISIT SF MDM: CPT

## 2022-01-27 PROCEDURE — 73630 X-RAY EXAM OF FOOT: CPT

## 2022-01-27 RX ADMIN — TETANUS TOXOID, REDUCED DIPHTHERIA TOXOID AND ACELLULAR PERTUSSIS VACCINE, ADSORBED 0.5 ML: 5; 2.5; 8; 8; 2.5 SUSPENSION INTRAMUSCULAR at 23:47

## 2022-01-28 VITALS
DIASTOLIC BLOOD PRESSURE: 75 MMHG | HEART RATE: 60 BPM | OXYGEN SATURATION: 95 % | SYSTOLIC BLOOD PRESSURE: 183 MMHG | WEIGHT: 236.77 LBS | TEMPERATURE: 98.4 F | RESPIRATION RATE: 18 BRPM | HEIGHT: 70 IN | BODY MASS INDEX: 33.9 KG/M2

## 2022-01-28 PROCEDURE — 25010000002 CEFAZOLIN PER 500 MG: Performed by: PHYSICIAN ASSISTANT

## 2022-01-28 PROCEDURE — 96374 THER/PROPH/DIAG INJ IV PUSH: CPT

## 2022-01-28 RX ORDER — DOXYCYCLINE 100 MG/1
100 TABLET ORAL 2 TIMES DAILY
Qty: 14 TABLET | Refills: 0 | Status: SHIPPED | OUTPATIENT
Start: 2022-01-28 | End: 2022-06-03

## 2022-01-28 RX ADMIN — CEFAZOLIN SODIUM 2 G: 10 INJECTION, POWDER, FOR SOLUTION INTRAVENOUS at 00:59

## 2022-03-04 VITALS
DIASTOLIC BLOOD PRESSURE: 57 MMHG | DIASTOLIC BLOOD PRESSURE: 55 MMHG | DIASTOLIC BLOOD PRESSURE: 80 MMHG | OXYGEN SATURATION: 92 % | RESPIRATION RATE: 16 BRPM | RESPIRATION RATE: 13 BRPM | OXYGEN SATURATION: 94 % | SYSTOLIC BLOOD PRESSURE: 171 MMHG | SYSTOLIC BLOOD PRESSURE: 166 MMHG | SYSTOLIC BLOOD PRESSURE: 186 MMHG | SYSTOLIC BLOOD PRESSURE: 150 MMHG | SYSTOLIC BLOOD PRESSURE: 157 MMHG | SYSTOLIC BLOOD PRESSURE: 149 MMHG | SYSTOLIC BLOOD PRESSURE: 145 MMHG | OXYGEN SATURATION: 97 % | HEART RATE: 59 BPM | OXYGEN SATURATION: 99 % | OXYGEN SATURATION: 98 % | WEIGHT: 235 LBS | SYSTOLIC BLOOD PRESSURE: 142 MMHG | SYSTOLIC BLOOD PRESSURE: 161 MMHG | DIASTOLIC BLOOD PRESSURE: 54 MMHG | DIASTOLIC BLOOD PRESSURE: 70 MMHG | RESPIRATION RATE: 15 BRPM | TEMPERATURE: 97.6 F | RESPIRATION RATE: 17 BRPM | DIASTOLIC BLOOD PRESSURE: 49 MMHG | SYSTOLIC BLOOD PRESSURE: 148 MMHG | HEART RATE: 60 BPM | HEART RATE: 62 BPM | DIASTOLIC BLOOD PRESSURE: 56 MMHG | SYSTOLIC BLOOD PRESSURE: 158 MMHG | HEART RATE: 65 BPM | SYSTOLIC BLOOD PRESSURE: 192 MMHG | DIASTOLIC BLOOD PRESSURE: 67 MMHG | HEIGHT: 70 IN | OXYGEN SATURATION: 93 % | RESPIRATION RATE: 12 BRPM | DIASTOLIC BLOOD PRESSURE: 51 MMHG | RESPIRATION RATE: 18 BRPM

## 2022-03-08 ENCOUNTER — AMBULATORY SURGICAL CENTER (AMBULATORY)
Dept: URBAN - METROPOLITAN AREA SURGERY 17 | Facility: SURGERY | Age: 72
End: 2022-03-08

## 2022-03-08 DIAGNOSIS — Z86.010 PERSONAL HISTORY OF COLONIC POLYPS: ICD-10-CM

## 2022-03-08 DIAGNOSIS — K63.5 POLYP OF COLON: ICD-10-CM

## 2022-03-08 PROCEDURE — 45385 COLONOSCOPY W/LESION REMOVAL: CPT | Mod: PT | Performed by: INTERNAL MEDICINE

## 2022-08-22 ENCOUNTER — OFFICE VISIT (OUTPATIENT)
Dept: CARDIOLOGY | Facility: CLINIC | Age: 72
End: 2022-08-22

## 2022-08-22 VITALS
HEIGHT: 70 IN | RESPIRATION RATE: 18 BRPM | WEIGHT: 224 LBS | SYSTOLIC BLOOD PRESSURE: 128 MMHG | HEART RATE: 60 BPM | DIASTOLIC BLOOD PRESSURE: 64 MMHG | BODY MASS INDEX: 32.07 KG/M2

## 2022-08-22 DIAGNOSIS — I49.5 SINUS NODE DYSFUNCTION: Primary | ICD-10-CM

## 2022-08-22 DIAGNOSIS — I10 PRIMARY HYPERTENSION: Chronic | ICD-10-CM

## 2022-08-22 DIAGNOSIS — I25.10 CORONARY ARTERY DISEASE INVOLVING NATIVE CORONARY ARTERY OF NATIVE HEART WITHOUT ANGINA PECTORIS: Chronic | ICD-10-CM

## 2022-08-22 DIAGNOSIS — Z95.0 PRESENCE OF CARDIAC PACEMAKER: Chronic | ICD-10-CM

## 2022-08-22 PROCEDURE — 93288 INTERROG EVL PM/LDLS PM IP: CPT | Performed by: INTERNAL MEDICINE

## 2022-08-22 PROCEDURE — 99212 OFFICE O/P EST SF 10 MIN: CPT | Performed by: INTERNAL MEDICINE

## 2022-08-22 RX ORDER — GLIMEPIRIDE 2 MG/1
1 TABLET ORAL 2 TIMES DAILY
COMMUNITY

## 2022-08-22 RX ORDER — PREDNISONE 1 MG/1
5 TABLET ORAL DAILY
COMMUNITY

## 2022-08-22 NOTE — PROGRESS NOTES
Subjective:     Encounter Date:08/22/2022      Patient ID: Armando Montano is a 71 y.o. male.    Chief Complaint:  Chief Complaint   Patient presents with   • Follow-up     SSS       HPI:  Mr Montano is a 68 yo who presents for followup after a pacemaker was implanted for symptomatic sinus bradycardia.  He is a patient of Dr. Rosemarie Shell with a past medical  History is significant for HTN, HLD, DM, CAD and MATHEW.  He also has had a renal and pancreas transplant.  He developed symptoms of increased fatigue and poor exercise tolerance.  A nuclear stress test was abnormal and a subsequent cath showed patent stent in the LAD, 60% OM and 40% RCA.  His pulse rates were primarily in the 40's and he had chronotropic incompetence.  He had a pacemaker implanted in 2020.     Since he was last seen, he has been doing well without symptoms of palpitations, chest pain or dyspnea.         The following portions of the patient's history were reviewed and updated as appropriate: current medications, past family history, past medical history, past social history, past surgical history and problem list.    Problem List:  Patient Active Problem List   Diagnosis   • Stroke (HCC)   • Sleep apnea   • Hypertension   • Dyslipidemia   • Diabetes mellitus (HCC)   • Coronary artery disease   • Bradycardia   • Status post insertion of drug eluting coronary artery stent   • History of simultaneous kidney and pancreas transplant (HCC)   • Abnormal nuclear stress test   • Sinus node dysfunction (HCC)   • Presence of cardiac pacemaker       Active Med List:    Current Outpatient Medications:   •  amLODIPine (NORVASC) 10 MG tablet, Take 10 mg by mouth Daily., Disp: , Rfl:   •  aspirin 81 MG EC tablet, Take 81 mg by mouth Daily., Disp: , Rfl:   •  atorvastatin (LIPITOR) 40 MG tablet, Take 40 mg by mouth Daily., Disp: , Rfl:   •  brimonidine-timolol (COMBIGAN) 0.2-0.5 % ophthalmic solution, Administer 1 drop to both eyes Every 12  (Twelve) Hours., Disp: , Rfl:   •  clopidogrel (PLAVIX) 75 MG tablet, TAKE 1 TABLET BY MOUTH DAILY, Disp: 90 tablet, Rfl: 3  •  docusate sodium (COLACE) 100 MG capsule, Take 100 mg by mouth As Needed for Constipation., Disp: , Rfl:   •  famotidine (PEPCID) 20 MG tablet, Take 20 mg by mouth 2 (Two) Times a Day., Disp: , Rfl:   •  gabapentin (NEURONTIN) 100 MG capsule, Take 100 mg by mouth 3 (Three) Times a Day., Disp: , Rfl:   •  glucosamine-chondroitin 500-400 MG capsule capsule, Take 1 capsule by mouth Daily., Disp: , Rfl:   •  hydrALAZINE (APRESOLINE) 25 MG tablet, Take 25 mg by mouth 3 (Three) Times a Day., Disp: , Rfl:   •  Multiple Vitamin (MULTI-VITAMIN DAILY PO), Take 1 tablet by mouth Daily., Disp: , Rfl:   •  mycophenolate (MYFORTIC) 360 MG tablet delayed-release EC tablet, Take 360 mg by mouth 4 (Four) Times a Day., Disp: , Rfl:   •  predniSONE (DELTASONE) 5 MG tablet, Take 5 mg by mouth Daily., Disp: , Rfl:   •  sertraline (ZOLOFT) 50 MG tablet, Take 50 mg by mouth Daily., Disp: , Rfl:   •  tacrolimus (PROGRAF) 1 MG capsule, Take 2 mg by mouth. qam and 1mg qpm, Disp: , Rfl:   •  timolol (TIMOPTIC) 0.25 % ophthalmic solution, 1 drop 2 (Two) Times a Day., Disp: , Rfl:      Past Medical History:  Past Medical History:   Diagnosis Date   • Bradycardia    • Coronary artery disease     May 2011: Vision bare-metal stent placed to the LAD.  December 2012: Xience drug-eluting stent placed to the LAD, overlapping the previous stent.   • Coronary artery disease     10/2009: Left main normal; mid LAD 30%; diagonal with minial disease. % stenosis, small vessel. RCA minimal disease. 5/2011: LAD with mid 80% stenosis and distal 40%. OM 2 with 30% stenosis. OM3, small with 80-90% stenosis RCA dominant with distal 20%. May 2011: Vision bare-metal stent to the LAD. 12/2012: Xience stent to the LAD overlapping previously placed stent. Stent placed to the OM2   • Diabetes mellitus (HCC)    • Hyperlipidemia    •  "Hypertension    • Sleep apnea     wears BiPap at night   • Stroke (HCC)        Past Surgical History:  Past Surgical History:   Procedure Laterality Date   • APPENDECTOMY     • CARDIAC CATHETERIZATION     • CARDIAC CATHETERIZATION N/A 9/23/2019    Procedure: Left Heart Cath;  Surgeon: Yanick Pereira MD;  Location:  [facility] CATH INVASIVE LOCATION;  Service: Cardiology   • CARDIAC ELECTROPHYSIOLOGY PROCEDURE N/A 11/8/2019    Procedure: Pacemaker DC new- [facility];  Surgeon: Sandor Carlson MD;  Location:  [facility] CATH INVASIVE LOCATION;  Service: Cardiology   • CHOLECYSTECTOMY     • COMBINED KIDNEY-PANCREAS TRANSPLANT     • CORONARY STENT PLACEMENT      May 2011: Vision bare-metal stent to the LAD. December 2012: Xience stent to the LAD overlapping the previously placed stent. Stent placed to the OM2.   • TONSILLECTOMY AND ADENOIDECTOMY         Social History:  Social History     Socioeconomic History   • Marital status:    Tobacco Use   • Smoking status: Never Smoker   • Smokeless tobacco: Never Used   Substance and Sexual Activity   • Alcohol use: Yes     Comment: rarely   • Drug use: No   • Sexual activity: Defer       Allergies:  Allergies   Allergen Reactions   • Furosemide Rash   • Rosuvastatin Calcium Rash and Myalgia   • Sulfamethoxazole-Trimethoprim Unknown (See Comments) and Rash       Immunizations:  Immunization History   Administered Date(s) Administered   • Tdap 01/27/2022          Objective:         Review of Systems   Constitutional: Negative for fatigue.   Respiratory: Negative for shortness of breath.    Cardiovascular: Negative for chest pain, palpitations and leg swelling.   All other systems reviewed and are negative.       /64   Pulse 60   Resp 18   Ht 177.8 cm (70\")   Wt 102 kg (224 lb)   BMI 32.14 kg/m²     Constitutional:       General: Not in acute distress.     Appearance: Well-developed.   Eyes:      General: No scleral icterus.     Conjunctiva/sclera: " Conjunctivae normal.      Pupils: Pupils are equal, round, and reactive to light.   HENT:      Head: Normocephalic and atraumatic.   Neck:      Thyroid: No thyromegaly.   Pulmonary:      Effort: Pulmonary effort is normal.      Breath sounds: Normal breath sounds.   Cardiovascular:      Normal rate. Regular rhythm.   Abdominal:      General: Bowel sounds are normal.      Palpations: Abdomen is soft.   Musculoskeletal: Normal range of motion.      Cervical back: Normal range of motion. Skin:     General: Skin is warm and dry.   Neurological:      Mental Status: Alert and oriented to person, place, and time.         In-Office Procedure(s):  Procedures  No orders to display      Pacemaker eval interpreted by [facility] 2272  Battery QUAN    P wave 5mv  Threshold 0.8V  Impedance 410 ohms    R wave 12mv  Threshld 0.8V  Impedance 510 ohms    ASCVD RIsk Score::  The ASCVD Risk score ([facility] Garza., et al., 2013) failed to calculate for the following reasons:    The patient has a prior MI or stroke diagnosis    Recent Radiology:          Lab Review:       Recent labs reviewed and interpreted for clinical significance and application          Assessment:          Diagnosis Plan   1. Sinus node dysfunction (HCC)     2. Presence of cardiac pacemaker     3. Coronary artery disease involving native coronary artery of native heart without angina pectoris     4. Primary hypertension            Plan:      1. SSS - s/p pacemaker  2. Pacemaker followup -  normal device function, enrolled in remote monitoring     RTC 1 year         Level of Care:                 Sandor Carlson MD  08/22/22

## 2022-09-12 DIAGNOSIS — I25.10 CORONARY ARTERY DISEASE INVOLVING NATIVE CORONARY ARTERY OF NATIVE HEART WITHOUT ANGINA PECTORIS: ICD-10-CM

## 2022-09-12 RX ORDER — CLOPIDOGREL BISULFATE 75 MG/1
75 TABLET ORAL DAILY
Qty: 90 TABLET | Refills: 3 | Status: SHIPPED | OUTPATIENT
Start: 2022-09-12

## 2022-09-12 RX ORDER — CLOPIDOGREL BISULFATE 75 MG/1
75 TABLET ORAL DAILY
Qty: 90 TABLET | Refills: 3 | Status: SHIPPED | OUTPATIENT
Start: 2022-09-12 | End: 2022-09-12

## 2022-10-03 PROCEDURE — 93296 REM INTERROG EVL PM/IDS: CPT | Performed by: INTERNAL MEDICINE

## 2022-10-03 PROCEDURE — 93294 REM INTERROG EVL PM/LDLS PM: CPT | Performed by: INTERNAL MEDICINE

## 2023-01-02 PROCEDURE — 93296 REM INTERROG EVL PM/IDS: CPT | Performed by: INTERNAL MEDICINE

## 2023-01-02 PROCEDURE — 93294 REM INTERROG EVL PM/LDLS PM: CPT | Performed by: INTERNAL MEDICINE

## 2023-04-20 ENCOUNTER — TELEPHONE (OUTPATIENT)
Dept: CARDIOLOGY | Facility: CLINIC | Age: 73
End: 2023-04-20
Payer: MEDICARE

## 2023-04-20 NOTE — TELEPHONE ENCOUNTER
Called and spoke wit patients wife.  She states patient is now seeing Dr. Pereira at [facility].  Remote monitoring will need to be transferred.

## (undated) DEVICE — PERCLOSE PROGLIDE™ SUTURE-MEDIATED CLOSURE SYSTEM: Brand: PERCLOSE PROGLIDE™

## (undated) DEVICE — INTRO SHEATH PRELUDE SNAP .038 6F 13CM W/SDPRT

## (undated) DEVICE — ST ACC MICROPUNCTURE STFF/CANN PLAT/TP 4F 21G 40CM

## (undated) DEVICE — PINNACLE INTRODUCER SHEATH: Brand: PINNACLE

## (undated) DEVICE — GW PTFE EMERALD HEPCOAT FC J TIP STD .035 3MM 150CM

## (undated) DEVICE — 3M™ STERI-STRIP™ ANTIMICROBIAL SKIN CLOSURES 1/2 INCH X 4 INCHES 50/CARTON 4 CARTONS/CASE A1847: Brand: 3M™ STERI-STRIP™

## (undated) DEVICE — CATH MPAC STP 6F: Brand: SUPER TORQUE

## (undated) DEVICE — SEALANT HEMOS FLOSEAL MATRX W/MALL TP 5ML EA/6

## (undated) DEVICE — SOL IRR NACL 0.9PCT BT 1000ML

## (undated) DEVICE — PENCL E/S HNDSWTCH SMOKEEVAC HOLSTR 10FT

## (undated) DEVICE — LOU PACE DEFIB: Brand: MEDLINE INDUSTRIES, INC.

## (undated) DEVICE — PK TRY HEART CATH 50